# Patient Record
Sex: FEMALE | Race: BLACK OR AFRICAN AMERICAN | NOT HISPANIC OR LATINO
[De-identification: names, ages, dates, MRNs, and addresses within clinical notes are randomized per-mention and may not be internally consistent; named-entity substitution may affect disease eponyms.]

---

## 2017-01-31 ENCOUNTER — APPOINTMENT (OUTPATIENT)
Dept: CARDIOLOGY | Facility: CLINIC | Age: 77
End: 2017-01-31

## 2017-01-31 ENCOUNTER — NON-APPOINTMENT (OUTPATIENT)
Age: 77
End: 2017-01-31

## 2017-01-31 VITALS
DIASTOLIC BLOOD PRESSURE: 70 MMHG | SYSTOLIC BLOOD PRESSURE: 110 MMHG | HEIGHT: 66 IN | WEIGHT: 180 LBS | BODY MASS INDEX: 28.93 KG/M2

## 2017-04-30 ENCOUNTER — EMERGENCY (EMERGENCY)
Facility: HOSPITAL | Age: 77
LOS: 1 days | Discharge: HOME | End: 2017-04-30
Attending: EMERGENCY MEDICINE | Admitting: EMERGENCY MEDICINE
Payer: MEDICARE

## 2017-04-30 VITALS
TEMPERATURE: 98 F | SYSTOLIC BLOOD PRESSURE: 165 MMHG | HEART RATE: 80 BPM | OXYGEN SATURATION: 97 % | RESPIRATION RATE: 18 BRPM | DIASTOLIC BLOOD PRESSURE: 100 MMHG

## 2017-04-30 VITALS
SYSTOLIC BLOOD PRESSURE: 152 MMHG | RESPIRATION RATE: 20 BRPM | DIASTOLIC BLOOD PRESSURE: 93 MMHG | HEART RATE: 55 BPM | TEMPERATURE: 99 F | OXYGEN SATURATION: 99 %

## 2017-04-30 DIAGNOSIS — E07.9 DISORDER OF THYROID, UNSPECIFIED: Chronic | ICD-10-CM

## 2017-04-30 DIAGNOSIS — Z90.710 ACQUIRED ABSENCE OF BOTH CERVIX AND UTERUS: Chronic | ICD-10-CM

## 2017-04-30 LAB
APTT BLD: 142.6 SEC — CRITICAL HIGH (ref 27.5–37.4)
APTT BLD: 65.8 SEC — HIGH (ref 27.5–37.4)
BASOPHILS # BLD AUTO: 0.04 K/UL — SIGNIFICANT CHANGE UP (ref 0–0.2)
BASOPHILS NFR BLD AUTO: 0.5 % — SIGNIFICANT CHANGE UP (ref 0–2)
EOSINOPHIL # BLD AUTO: 0.08 K/UL — SIGNIFICANT CHANGE UP (ref 0–0.5)
EOSINOPHIL NFR BLD AUTO: 1 % — SIGNIFICANT CHANGE UP (ref 0–6)
HCT VFR BLD CALC: 46.4 % — HIGH (ref 34.5–45)
HGB BLD-MCNC: 14.8 G/DL — SIGNIFICANT CHANGE UP (ref 11.5–15.5)
IMM GRANULOCYTES NFR BLD AUTO: 0.4 % — SIGNIFICANT CHANGE UP (ref 0–1.5)
INR BLD: 4.88 — HIGH (ref 0.88–1.17)
INR BLD: 5.13 — CRITICAL HIGH (ref 0.88–1.17)
LYMPHOCYTES # BLD AUTO: 2.39 K/UL — SIGNIFICANT CHANGE UP (ref 1–3.3)
LYMPHOCYTES # BLD AUTO: 28.6 % — SIGNIFICANT CHANGE UP (ref 13–44)
MCHC RBC-ENTMCNC: 28.1 PG — SIGNIFICANT CHANGE UP (ref 27–34)
MCHC RBC-ENTMCNC: 31.9 % — LOW (ref 32–36)
MCV RBC AUTO: 88.2 FL — SIGNIFICANT CHANGE UP (ref 80–100)
MONOCYTES # BLD AUTO: 0.69 K/UL — SIGNIFICANT CHANGE UP (ref 0–0.9)
MONOCYTES NFR BLD AUTO: 8.3 % — SIGNIFICANT CHANGE UP (ref 2–14)
NEUTROPHILS # BLD AUTO: 5.12 K/UL — SIGNIFICANT CHANGE UP (ref 1.8–7.4)
NEUTROPHILS NFR BLD AUTO: 61.2 % — SIGNIFICANT CHANGE UP (ref 43–77)
PLATELET # BLD AUTO: 308 K/UL — SIGNIFICANT CHANGE UP (ref 150–400)
PMV BLD: 12 FL — SIGNIFICANT CHANGE UP (ref 7–13)
PROTHROM AB SERPL-ACNC: 56.5 SEC — HIGH (ref 9.8–13.1)
PROTHROM AB SERPL-ACNC: 59.4 SEC — HIGH (ref 9.8–13.1)
RBC # BLD: 5.26 M/UL — HIGH (ref 3.8–5.2)
RBC # FLD: 14.3 % — SIGNIFICANT CHANGE UP (ref 10.3–14.5)
WBC # BLD: 8.35 K/UL — SIGNIFICANT CHANGE UP (ref 3.8–10.5)
WBC # FLD AUTO: 8.35 K/UL — SIGNIFICANT CHANGE UP (ref 3.8–10.5)

## 2017-04-30 PROCEDURE — 30901 CONTROL OF NOSEBLEED: CPT | Mod: RT

## 2017-04-30 PROCEDURE — 99284 EMERGENCY DEPT VISIT MOD MDM: CPT | Mod: 25,GC

## 2017-04-30 RX ORDER — CEPHALEXIN 500 MG
1 CAPSULE ORAL
Qty: 30 | Refills: 0 | OUTPATIENT
Start: 2017-04-30 | End: 2017-05-10

## 2017-04-30 RX ORDER — TRANEXAMIC ACID 100 MG/ML
1000 INJECTION, SOLUTION INTRAVENOUS ONCE
Qty: 0 | Refills: 0 | Status: DISCONTINUED | OUTPATIENT
Start: 2017-04-30 | End: 2017-04-30

## 2017-04-30 RX ORDER — CEPHALEXIN 500 MG
500 CAPSULE ORAL EVERY 12 HOURS
Qty: 0 | Refills: 0 | Status: DISCONTINUED | OUTPATIENT
Start: 2017-04-30 | End: 2017-05-04

## 2017-04-30 RX ADMIN — Medication 500 MILLIGRAM(S): at 15:44

## 2017-04-30 NOTE — ED PROVIDER NOTE - PLAN OF CARE
Follow up with your PMD this week.  Do not take your coumadin for the next 2 days until you see your MD.  Repeat or worsening bleeding or any other symptoms of concern return to ED.

## 2017-04-30 NOTE — ED PROVIDER NOTE - MEDICAL DECISION MAKING DETAILS
78 y/o F w/pmh CVA, a-fib on coumadin , HTN p/w right sided nose bleeding since 7:30pm last night, right nare bleeding, cannot visualize source of active bleeding, mild b/l pale eye conjunctiva.  -CBC/ PT/PTT/INR

## 2017-04-30 NOTE — ED PROVIDER NOTE - OBJECTIVE STATEMENT
78 y/o F w/ PMH CVA in 2008 with residual right sided weakness, HTN, arrythmia, currently on coumadin p/w right sided nose bleed since 7:30pm last night. Pt states that the nose started suddenly, described as dripping. Pt denies hx of nose bleeds or bleeding tendencies. She denies recent trauma/falls, fever/chills, headache, dizziness, weakness, hemoptysis, melena, recent URI, cough, chest pain, palpitations, SOB, abdominal pain, N/V.

## 2017-04-30 NOTE — ED ADULT NURSE NOTE - OBJECTIVE STATEMENT
Patient received to room 7 intake alert, oriented x3, able to make needs known verbally. patient complaining of nose bleed oozing from left nare.  Patient reports having the nose bleed since last night around 7:30 pm. Patient currently taking angicoagulant.  Vital signs recorded, MATTIE Tellez at bedside evaluating patient.  Bloodwork obtained, no complaints of pain.  Patient ambulatory with cane and one person assist.  No sign of acute distress noted, will continue to monitor.

## 2017-04-30 NOTE — ED PROVIDER NOTE - ATTENDING CONTRIBUTION TO CARE
78 y/o F with h/o stroke, htn, af on coumadin here with epistaxis.  Pt reports bleeding from right nare since last night.  No h/o trauma.  Last dose of coumadin last night.  Had INR check on Wed, but does not know results.  Not on any other antiplatelets.  No other complaints.  Well appearing, lying comfortably in stretcher, awake and alert, nontoxic.  VSS.  NCAT, EOMI, (+)dried blood to anterior right nare, no nasal deformity, no bleeding to post oropharynx, left nare is patent without bleeding.  INR elevated here, will have hold next dose, PTT also elevated, will send for heme follow-up for further work-up.  Direct pressure, topical txa, reassess.

## 2017-04-30 NOTE — ED PROVIDER NOTE - PHYSICAL EXAMINATION
Skin: Skin turgor, normal cap refill  Nose: Right nostril bleeding, cannot visualize source of active bleeding  Eyes: Mild b/l pale conjunctiva

## 2017-04-30 NOTE — ED PROVIDER NOTE - CARE PLAN
Principal Discharge DX:	Elevated INR  Instructions for follow-up, activity and diet:	Follow up with your PMD this week.  Do not take your coumadin for the next 2 days until you see your MD.  Repeat or worsening bleeding or any other symptoms of concern return to ED.

## 2017-05-01 ENCOUNTER — EMERGENCY (EMERGENCY)
Facility: HOSPITAL | Age: 77
LOS: 1 days | Discharge: HOME | End: 2017-05-01
Attending: EMERGENCY MEDICINE | Admitting: EMERGENCY MEDICINE
Payer: MEDICARE

## 2017-05-01 VITALS
DIASTOLIC BLOOD PRESSURE: 88 MMHG | RESPIRATION RATE: 16 BRPM | TEMPERATURE: 99 F | SYSTOLIC BLOOD PRESSURE: 148 MMHG | HEART RATE: 86 BPM | OXYGEN SATURATION: 97 %

## 2017-05-01 DIAGNOSIS — Z90.710 ACQUIRED ABSENCE OF BOTH CERVIX AND UTERUS: Chronic | ICD-10-CM

## 2017-05-01 DIAGNOSIS — E07.9 DISORDER OF THYROID, UNSPECIFIED: Chronic | ICD-10-CM

## 2017-05-01 PROCEDURE — 99285 EMERGENCY DEPT VISIT HI MDM: CPT

## 2017-05-01 NOTE — ED PROVIDER NOTE - MEDICAL DECISION MAKING DETAILS
77y F presents for epistaxis. Rhino rocket removed. No active bleeding. Will recheck labs as pt has been on antibiotics and observe for bleeding.

## 2017-05-01 NOTE — ED PROVIDER NOTE - NOSE FINDINGS
right nasal tampon in place. no active bleeding. right nasal tampon in place. no active bleeding. ome irritation to septum, no septal hematoma or necrosis

## 2017-05-01 NOTE — ED ADULT TRIAGE NOTE - CHIEF COMPLAINT QUOTE
Here to remove nasal rocket. Unable to see MD outpatient. Pt had in placed for 1 day. Pt is on coumadin. Here to remove nasal rocket. Unable to see MD outpatient. Pt had it in placed for 1 day. Pt is on coumadin.

## 2017-05-01 NOTE — ED PROVIDER NOTE - PROGRESS NOTE DETAILS
MATTIE Vera RW - received pt s/p Quids eval by Dr loredo, pt s/p epistaxis, had packing inserted 2 days ago, couldn't get appoitment with ent doc, here for packing removal (was removed by Dr Loredo); pt offers no compliants but upon last visit had elevated INR - recheck pending - if going down will dc with cardio/ent f/u,. MATTIE Vera - spoke with Dr Dent cardio - pt f/u with pcp for inr check and she advsies calling pcp; MATTIE Vera - spoke with Dr Dent cardio - pt f/u with pcp for inr check and she advises calling pcp; message left for call back with Pmd's answering service' MATTIE Vera - no call back from pmd, discussed with dr malave - will advise to restart Coumadin tomorrow, f/u with pm on 5/3, bacitracin ointment to the are twice daily.

## 2017-05-01 NOTE — ED PROVIDER NOTE - OBJECTIVE STATEMENT
77y F with PMHx of AFib on Coumadin presents to the ED for nasal rocket removal today. Pt had nasal rocket placed in right nostril yesterday. Pt taking antibiotics. Denies any bleeding, fever, chills, or any other complaints. 77y F with PMHx of AFib on Coumadin presents to the ED for nasal rocket removal today. Pt had epistaxis yesterday and had nasal rocket placed in right nostril. Pt taking antibiotics. Denies any bleeding, fever, chills, or any other complaints.

## 2017-05-01 NOTE — ED PROVIDER NOTE - NS ED MD SCRIBE ATTENDING SCRIBE SECTIONS
REVIEW OF SYSTEMS/DISPOSITION/HISTORY OF PRESENT ILLNESS/PAST MEDICAL/SURGICAL/SOCIAL HISTORY/PHYSICAL EXAM/VITAL SIGNS( Pullset)

## 2017-05-01 NOTE — ED PROVIDER NOTE - PLAN OF CARE
PLEASE START TAKING COUMADIN TOMORROW, FOLLOW UP WITH YOUR DOCTOR WITHIN NEXT 48HRS FOR REPEAT BLOOD WORK. APPLY BACITRACIN ANTIBIOTIC TO THE AREA THREE TIMES DAILY, CONTINUE TAKING ANTIBIOTICS AS PRESCRIBED. RETURN TO ER FOR PROLONGED NOSE BLEED, CHEST PAIN, DIZZINESS, SHORTNESS OF BREATH.

## 2017-05-01 NOTE — ED PROVIDER NOTE - CARE PLAN
Principal Discharge DX:	Epistaxis  Secondary Diagnosis:	INR (international normal ratio) abnormal Principal Discharge DX:	Epistaxis  Instructions for follow-up, activity and diet:	PLEASE START TAKING COUMADIN TOMORROW, FOLLOW UP WITH YOUR DOCTOR WITHIN NEXT 48HRS FOR REPEAT BLOOD WORK. APPLY BACITRACIN ANTIBIOTIC TO THE AREA THREE TIMES DAILY, CONTINUE TAKING ANTIBIOTICS AS PRESCRIBED. RETURN TO ER FOR PROLONGED NOSE BLEED, CHEST PAIN, DIZZINESS, SHORTNESS OF BREATH.  Secondary Diagnosis:	INR (international normal ratio) abnormal

## 2017-05-02 ENCOUNTER — NON-APPOINTMENT (OUTPATIENT)
Age: 77
End: 2017-05-02

## 2017-05-02 ENCOUNTER — APPOINTMENT (OUTPATIENT)
Dept: CARDIOLOGY | Facility: CLINIC | Age: 77
End: 2017-05-02

## 2017-05-02 VITALS
HEART RATE: 81 BPM | WEIGHT: 185 LBS | BODY MASS INDEX: 29.73 KG/M2 | HEIGHT: 66 IN | SYSTOLIC BLOOD PRESSURE: 92 MMHG | DIASTOLIC BLOOD PRESSURE: 60 MMHG

## 2017-06-13 ENCOUNTER — APPOINTMENT (OUTPATIENT)
Dept: CARDIOLOGY | Facility: CLINIC | Age: 77
End: 2017-06-13

## 2017-06-13 VITALS
BODY MASS INDEX: 28.93 KG/M2 | SYSTOLIC BLOOD PRESSURE: 132 MMHG | DIASTOLIC BLOOD PRESSURE: 74 MMHG | HEART RATE: 72 BPM | HEIGHT: 66 IN | WEIGHT: 180 LBS | OXYGEN SATURATION: 96 %

## 2017-08-22 ENCOUNTER — APPOINTMENT (OUTPATIENT)
Dept: OTOLARYNGOLOGY | Facility: CLINIC | Age: 77
End: 2017-08-22
Payer: MEDICARE

## 2017-08-22 VITALS
WEIGHT: 180 LBS | HEIGHT: 66 IN | BODY MASS INDEX: 28.93 KG/M2 | HEART RATE: 54 BPM | DIASTOLIC BLOOD PRESSURE: 85 MMHG | SYSTOLIC BLOOD PRESSURE: 138 MMHG

## 2017-08-22 PROCEDURE — 99203 OFFICE O/P NEW LOW 30 MIN: CPT

## 2017-10-12 ENCOUNTER — APPOINTMENT (OUTPATIENT)
Dept: CARDIOLOGY | Facility: CLINIC | Age: 77
End: 2017-10-12
Payer: MEDICARE

## 2017-10-12 VITALS
SYSTOLIC BLOOD PRESSURE: 122 MMHG | HEART RATE: 78 BPM | HEIGHT: 66 IN | RESPIRATION RATE: 14 BRPM | DIASTOLIC BLOOD PRESSURE: 70 MMHG | WEIGHT: 182 LBS | BODY MASS INDEX: 29.25 KG/M2

## 2017-10-12 PROCEDURE — 99214 OFFICE O/P EST MOD 30 MIN: CPT

## 2017-10-13 ENCOUNTER — APPOINTMENT (OUTPATIENT)
Dept: OTOLARYNGOLOGY | Facility: CLINIC | Age: 77
End: 2017-10-13
Payer: MEDICARE

## 2017-10-13 ENCOUNTER — APPOINTMENT (OUTPATIENT)
Dept: CARDIOLOGY | Facility: CLINIC | Age: 77
End: 2017-10-13

## 2017-10-13 VITALS
HEIGHT: 66 IN | DIASTOLIC BLOOD PRESSURE: 94 MMHG | SYSTOLIC BLOOD PRESSURE: 186 MMHG | BODY MASS INDEX: 29.25 KG/M2 | WEIGHT: 182 LBS | HEART RATE: 80 BPM

## 2017-10-13 PROCEDURE — 31231 NASAL ENDOSCOPY DX: CPT

## 2017-10-13 PROCEDURE — 99214 OFFICE O/P EST MOD 30 MIN: CPT | Mod: 25

## 2017-10-13 RX ORDER — DICLOFENAC SODIUM 10 MG/G
1 GEL TOPICAL
Qty: 100 | Refills: 0 | Status: DISCONTINUED | COMMUNITY
Start: 2016-10-26

## 2017-10-13 RX ORDER — APIXABAN 5 MG/1
5 TABLET, FILM COATED ORAL
Refills: 0 | Status: DISCONTINUED | COMMUNITY
End: 2017-10-13

## 2017-10-19 ENCOUNTER — APPOINTMENT (OUTPATIENT)
Dept: CARDIOLOGY | Facility: CLINIC | Age: 77
End: 2017-10-19
Payer: MEDICARE

## 2017-10-19 PROCEDURE — 93306 TTE W/DOPPLER COMPLETE: CPT

## 2018-01-15 ENCOUNTER — APPOINTMENT (OUTPATIENT)
Dept: CARDIOLOGY | Facility: CLINIC | Age: 78
End: 2018-01-15
Payer: MEDICARE

## 2018-01-15 ENCOUNTER — NON-APPOINTMENT (OUTPATIENT)
Age: 78
End: 2018-01-15

## 2018-01-15 VITALS
WEIGHT: 179 LBS | SYSTOLIC BLOOD PRESSURE: 148 MMHG | DIASTOLIC BLOOD PRESSURE: 82 MMHG | BODY MASS INDEX: 28.77 KG/M2 | HEART RATE: 90 BPM | HEIGHT: 66 IN

## 2018-01-15 PROCEDURE — 93000 ELECTROCARDIOGRAM COMPLETE: CPT

## 2018-01-15 PROCEDURE — 99214 OFFICE O/P EST MOD 30 MIN: CPT

## 2018-01-18 ENCOUNTER — OUTPATIENT (OUTPATIENT)
Dept: OUTPATIENT SERVICES | Facility: HOSPITAL | Age: 78
LOS: 1 days | End: 2018-01-18
Payer: MEDICARE

## 2018-01-18 VITALS
WEIGHT: 181 LBS | TEMPERATURE: 98 F | HEART RATE: 92 BPM | HEIGHT: 65.5 IN | RESPIRATION RATE: 16 BRPM | DIASTOLIC BLOOD PRESSURE: 78 MMHG | SYSTOLIC BLOOD PRESSURE: 170 MMHG

## 2018-01-18 DIAGNOSIS — Z90.710 ACQUIRED ABSENCE OF BOTH CERVIX AND UTERUS: Chronic | ICD-10-CM

## 2018-01-18 DIAGNOSIS — J34.89 OTHER SPECIFIED DISORDERS OF NOSE AND NASAL SINUSES: ICD-10-CM

## 2018-01-18 DIAGNOSIS — E07.9 DISORDER OF THYROID, UNSPECIFIED: Chronic | ICD-10-CM

## 2018-01-18 DIAGNOSIS — I48.91 UNSPECIFIED ATRIAL FIBRILLATION: ICD-10-CM

## 2018-01-18 DIAGNOSIS — I10 ESSENTIAL (PRIMARY) HYPERTENSION: ICD-10-CM

## 2018-01-18 DIAGNOSIS — I63.9 CEREBRAL INFARCTION, UNSPECIFIED: ICD-10-CM

## 2018-01-18 LAB
BUN SERPL-MCNC: 15 MG/DL — SIGNIFICANT CHANGE UP (ref 7–23)
CALCIUM SERPL-MCNC: 9.4 MG/DL — SIGNIFICANT CHANGE UP (ref 8.4–10.5)
CHLORIDE SERPL-SCNC: 100 MMOL/L — SIGNIFICANT CHANGE UP (ref 98–107)
CO2 SERPL-SCNC: 26 MMOL/L — SIGNIFICANT CHANGE UP (ref 22–31)
CREAT SERPL-MCNC: 0.63 MG/DL — SIGNIFICANT CHANGE UP (ref 0.5–1.3)
GLUCOSE SERPL-MCNC: 95 MG/DL — SIGNIFICANT CHANGE UP (ref 70–99)
HCT VFR BLD CALC: 44.4 % — SIGNIFICANT CHANGE UP (ref 34.5–45)
HGB BLD-MCNC: 14.1 G/DL — SIGNIFICANT CHANGE UP (ref 11.5–15.5)
MCHC RBC-ENTMCNC: 28.1 PG — SIGNIFICANT CHANGE UP (ref 27–34)
MCHC RBC-ENTMCNC: 31.8 % — LOW (ref 32–36)
MCV RBC AUTO: 88.4 FL — SIGNIFICANT CHANGE UP (ref 80–100)
NRBC # FLD: 0 — SIGNIFICANT CHANGE UP
PLATELET # BLD AUTO: 294 K/UL — SIGNIFICANT CHANGE UP (ref 150–400)
PMV BLD: 11.2 FL — SIGNIFICANT CHANGE UP (ref 7–13)
POTASSIUM SERPL-MCNC: 4 MMOL/L — SIGNIFICANT CHANGE UP (ref 3.5–5.3)
POTASSIUM SERPL-SCNC: 4 MMOL/L — SIGNIFICANT CHANGE UP (ref 3.5–5.3)
RBC # BLD: 5.02 M/UL — SIGNIFICANT CHANGE UP (ref 3.8–5.2)
RBC # FLD: 13.9 % — SIGNIFICANT CHANGE UP (ref 10.3–14.5)
SODIUM SERPL-SCNC: 141 MMOL/L — SIGNIFICANT CHANGE UP (ref 135–145)
WBC # BLD: 7.72 K/UL — SIGNIFICANT CHANGE UP (ref 3.8–10.5)
WBC # FLD AUTO: 7.72 K/UL — SIGNIFICANT CHANGE UP (ref 3.8–10.5)

## 2018-01-18 PROCEDURE — 93010 ELECTROCARDIOGRAM REPORT: CPT

## 2018-01-18 RX ORDER — LISINOPRIL 2.5 MG/1
0 TABLET ORAL
Qty: 0 | Refills: 0 | COMMUNITY

## 2018-01-18 RX ORDER — AMLODIPINE BESYLATE 2.5 MG/1
0 TABLET ORAL
Qty: 0 | Refills: 0 | COMMUNITY

## 2018-01-18 RX ORDER — WARFARIN SODIUM 2.5 MG/1
0 TABLET ORAL
Qty: 0 | Refills: 0 | COMMUNITY

## 2018-01-18 NOTE — H&P PST ADULT - PROBLEM SELECTOR PLAN 2
Pt is currently on Eliquis. Spoke to Cardiologist Dr. Dent and she said the minimum time the pt should hold the Eliquis is 48 hours and if there is a possibility for significant blood loss, Eliquis should be held for 48-72 hours. Will Notify Dr. Fuentes  Cardiac clearance note requested Pt is currently on Eliquis. Spoke to Cardiologist Dr. Dent and she said the minimum time the pt should hold the Eliquis is 48 hours and if there is a possibility for significant blood loss, Eliquis should be held for 48-72 hours. Emailed Dr. Fuentes with the recommendation provided by the Cardiologist.  Cardiac clearance note requested

## 2018-01-18 NOTE — H&P PST ADULT - ACTIVITY
walks outside and in lobby of building, moderate assisted with ADLs walks outside and in lobby of building daily, moderate assisted with ADLs

## 2018-01-18 NOTE — H&P PST ADULT - RS GEN PE MLT RESP DETAILS PC
respirations non-labored/airway patent/breath sounds equal/good air movement/no chest wall tenderness/clear to auscultation bilaterally

## 2018-01-18 NOTE — H&P PST ADULT - PMH
Atrial fibrillation  currently on Eliquis since summer 2017  Bradycardia    CVA (cerebral vascular accident)  2008, right side weakness and impaired speech since CVA  Hypertension    Other specified disorders of nose and nasal sinuses    Tricuspid regurgitation

## 2018-01-18 NOTE — H&P PST ADULT - PROBLEM SELECTOR PLAN 1
Scheduled for Resection of Right Nasal Vestibular Stenosis, Nasal Alar Repositioning, Full Thickness Skin Graft on 1/23/2018.  Pre op instructions given, pt verbalized understanding   GI prophylaxis provided

## 2018-01-18 NOTE — H&P PST ADULT - ENMT COMMENTS
dx of other specified disorders of nose and nasal sinuses. See HPI right nasal turbinate almost completely closed off. Nose: right nare stenosis

## 2018-01-18 NOTE — H&P PST ADULT - NEGATIVE BREAST SYMPTOMS
no nipple discharge R/no breast lump L/no breast tenderness L/no breast tenderness R/no breast lump R/no nipple discharge L

## 2018-01-18 NOTE — H&P PST ADULT - NEUROLOGICAL COMMENTS
h/o CVA in 2008. Pt with residual of right side weakness. Speech is still affected but much improved over the years. h/o CVA in 2008. Residual right side weakness. Speech is still affected but much improved over the years. h/o CVA in 2008. Residual right side weakness. Speech is still affected but much improved over the years. Pt has never seen Neuro since CVA. follows up with PCP

## 2018-01-18 NOTE — H&P PST ADULT - NSANTHOSAYNRD_GEN_A_CORE
No. MANDO screening performed.  STOP BANG Legend: 0-2 = LOW Risk; 3-4 = INTERMEDIATE Risk; 5-8 = HIGH Risk

## 2018-01-18 NOTE — H&P PST ADULT - PROBLEM SELECTOR PLAN 4
Pt instructed to take Enalapril, Carvedilol, Clonidine and Amlodipine AM of surgery with a sip of water  ECHO, Stress Test reports requested. BP reassessed and 160/90   Pt instructed to take Enalapril, Carvedilol, Clonidine and Amlodipine AM of surgery with a sip of water  ECHO, Stress Test reports requested.

## 2018-01-18 NOTE — H&P PST ADULT - LYMPHATIC
posterior cervical L/anterior cervical L/supraclavicular L/posterior cervical R/anterior cervical R/supraclavicular R

## 2018-01-18 NOTE — H&P PST ADULT - REASON FOR ADMISSION
As per daughter, "My mother is having surgery to open up her nasal passage". As per sister: "She is having surgery to open up her nasal passage".

## 2018-01-18 NOTE — H&P PST ADULT - NEGATIVE NEUROLOGICAL SYMPTOMS
no confusion/no loss of consciousness/no transient paralysis/no tremors/no hemiparesis/no facial palsy/no headache/no weakness

## 2018-01-18 NOTE — H&P PST ADULT - HISTORY OF PRESENT ILLNESS
76 y/o female with PMH of Afib on Eliquis, HTN, Tricuspid Regurgitation presents to PST for preoperative evaluation with dx of other specified disorders of nose and nasal sinuses.  H/o nose bleeds while on Coumadin. s/p insertion of a  rhino picket in May 2017 which stopped the bleeding. A few months later, pt noticed her right nostril became stenosed and she had trouble breathing through that nostril.  Seen by ENT and Scheduled for Resection of Right Nasal Vestibular Stenosis, Nasal Alar Repositioning, Full Thickness Skin Graft on 1/23/2018. She is currently on Eliquis for the management of her Afib. 76 y/o female with PMH of Afib on Eliquis, HTN, Tricuspid Regurgitation and CVA in 2008 presents to PST for preoperative evaluation with dx of other specified disorders of nose and nasal sinuses.  H/o nose bleeds while on Coumadin. s/p insertion of a rhino rocket in May 2017 which stopped the bleeding. A few months later, pt noticed her right nostril became stenosed and she had trouble breathing through that nostril.  Seen by ENT and Scheduled for Resection of Right Nasal Vestibular Stenosis, Nasal Alar Repositioning, Full Thickness Skin Graft on 1/23/2018. She is currently on Eliquis for the management of Afib.

## 2018-01-18 NOTE — H&P PST ADULT - ENT GEN HX ROS MEA POS PC
last nose bleed since may 2017 while on coumadin/epistaxis epistaxis/last nose bleed May 2017 while on coumadin

## 2018-01-18 NOTE — H&P PST ADULT - NEGATIVE CARDIOVASCULAR SYMPTOMS
no peripheral edema/no dyspnea on exertion/no chest pain/no paroxysmal nocturnal dyspnea/no palpitations

## 2018-01-18 NOTE — H&P PST ADULT - PSH
Disorder of thyroid  s/p partial thryroidectomy 35 years ago  H/O: hysterectomy  30 years ago Disorder of thyroid  s/p partial thyroidectomy 35 years ago  H/O: hysterectomy  30 years ago

## 2018-01-18 NOTE — H&P PST ADULT - FAMILY HISTORY
Mother  Still living? No  Family history of heart attack, Age at diagnosis: Age Unknown     Father  Still living? Unknown  Family history of atrial fibrillation, Age at diagnosis: Age Unknown  Family history of cerebrovascular accident (CVA), Age at diagnosis: Age Unknown     Sibling  Still living? Unknown  Family history of atrial fibrillation, Age at diagnosis: Age Unknown

## 2018-01-23 ENCOUNTER — TRANSCRIPTION ENCOUNTER (OUTPATIENT)
Age: 78
End: 2018-01-23

## 2018-01-23 ENCOUNTER — OUTPATIENT (OUTPATIENT)
Dept: OUTPATIENT SERVICES | Facility: HOSPITAL | Age: 78
LOS: 1 days | Discharge: HOME | End: 2018-01-23
Payer: MEDICARE

## 2018-01-23 ENCOUNTER — APPOINTMENT (OUTPATIENT)
Dept: OTOLARYNGOLOGY | Facility: HOSPITAL | Age: 78
End: 2018-01-23

## 2018-01-23 ENCOUNTER — RESULT REVIEW (OUTPATIENT)
Age: 78
End: 2018-01-23

## 2018-01-23 VITALS
TEMPERATURE: 98 F | DIASTOLIC BLOOD PRESSURE: 89 MMHG | SYSTOLIC BLOOD PRESSURE: 155 MMHG | OXYGEN SATURATION: 97 % | RESPIRATION RATE: 15 BRPM | HEART RATE: 76 BPM

## 2018-01-23 VITALS
SYSTOLIC BLOOD PRESSURE: 155 MMHG | RESPIRATION RATE: 20 BRPM | HEIGHT: 65 IN | TEMPERATURE: 97 F | WEIGHT: 179.9 LBS | OXYGEN SATURATION: 97 % | HEART RATE: 74 BPM | DIASTOLIC BLOOD PRESSURE: 86 MMHG

## 2018-01-23 DIAGNOSIS — E07.9 DISORDER OF THYROID, UNSPECIFIED: Chronic | ICD-10-CM

## 2018-01-23 DIAGNOSIS — J34.89 OTHER SPECIFIED DISORDERS OF NOSE AND NASAL SINUSES: ICD-10-CM

## 2018-01-23 DIAGNOSIS — Z90.710 ACQUIRED ABSENCE OF BOTH CERVIX AND UTERUS: Chronic | ICD-10-CM

## 2018-01-23 PROCEDURE — 30465 REPAIR NASAL STENOSIS: CPT | Mod: RT,GC

## 2018-01-23 PROCEDURE — 15760 COMPOSITE SKIN GRAFT: CPT

## 2018-01-23 PROCEDURE — 88302 TISSUE EXAM BY PATHOLOGIST: CPT | Mod: 26

## 2018-01-23 RX ORDER — FENTANYL CITRATE 50 UG/ML
50 INJECTION INTRAVENOUS
Qty: 0 | Refills: 0 | Status: DISCONTINUED | OUTPATIENT
Start: 2018-01-23 | End: 2018-01-24

## 2018-01-23 RX ORDER — ONDANSETRON 8 MG/1
4 TABLET, FILM COATED ORAL ONCE
Qty: 0 | Refills: 0 | Status: DISCONTINUED | OUTPATIENT
Start: 2018-01-23 | End: 2018-01-24

## 2018-01-23 RX ORDER — SODIUM CHLORIDE 9 MG/ML
1000 INJECTION, SOLUTION INTRAVENOUS
Qty: 0 | Refills: 0 | Status: DISCONTINUED | OUTPATIENT
Start: 2018-01-23 | End: 2018-01-24

## 2018-01-23 RX ORDER — OXYCODONE AND ACETAMINOPHEN 5; 325 MG/1; MG/1
1 TABLET ORAL EVERY 6 HOURS
Qty: 0 | Refills: 0 | Status: DISCONTINUED | OUTPATIENT
Start: 2018-01-23 | End: 2018-01-24

## 2018-01-23 RX ORDER — FENTANYL CITRATE 50 UG/ML
25 INJECTION INTRAVENOUS
Qty: 0 | Refills: 0 | Status: DISCONTINUED | OUTPATIENT
Start: 2018-01-23 | End: 2018-01-24

## 2018-01-23 RX ORDER — OXYCODONE AND ACETAMINOPHEN 5; 325 MG/1; MG/1
1 TABLET ORAL
Qty: 12 | Refills: 0
Start: 2018-01-23 | End: 2018-01-25

## 2018-01-23 RX ORDER — SODIUM CHLORIDE 9 MG/ML
1000 INJECTION, SOLUTION INTRAVENOUS
Qty: 0 | Refills: 0 | Status: DISCONTINUED | OUTPATIENT
Start: 2018-01-23 | End: 2018-01-23

## 2018-01-23 RX ORDER — FUROSEMIDE 40 MG
1 TABLET ORAL
Qty: 0 | Refills: 0 | COMMUNITY

## 2018-01-23 RX ORDER — CEPHALEXIN 500 MG
1 CAPSULE ORAL
Qty: 14 | Refills: 0
Start: 2018-01-23 | End: 2018-01-29

## 2018-01-23 RX ORDER — SODIUM CHLORIDE 9 MG/ML
3 INJECTION INTRAMUSCULAR; INTRAVENOUS; SUBCUTANEOUS ONCE
Qty: 0 | Refills: 0 | Status: DISCONTINUED | OUTPATIENT
Start: 2018-01-23 | End: 2018-01-24

## 2018-01-23 RX ORDER — APIXABAN 2.5 MG/1
1 TABLET, FILM COATED ORAL
Qty: 0 | Refills: 0 | COMMUNITY

## 2018-01-23 RX ORDER — CARVEDILOL PHOSPHATE 80 MG/1
1 CAPSULE, EXTENDED RELEASE ORAL
Qty: 0 | Refills: 0 | COMMUNITY

## 2018-01-23 RX ADMIN — SODIUM CHLORIDE 75 MILLILITER(S): 9 INJECTION, SOLUTION INTRAVENOUS at 17:28

## 2018-01-23 NOTE — ASU DISCHARGE PLAN (ADULT/PEDIATRIC). - ITEMS TO FOLLOWUP WITH YOUR PHYSICIAN'S
After showering pat dry steri strips.  Do Not rub them.  They will curl up and fall off by themselves within 7 days.   No strenuous exercising, aerobics or lifting for two weeks. Liquid and soft diet for the first 24 hours. Resume regular diet as tolerated. Nothing hot in temperature to eat or drink, avoid hot baths.  No nose blowing - sneeze with mouth open.

## 2018-01-23 NOTE — ASU DISCHARGE PLAN (ADULT/PEDIATRIC). - COMMENTS
Surgical Unit will call you on the next business day to follow up. [facility] is open Monday - Friday.

## 2018-01-23 NOTE — ASU DISCHARGE PLAN (ADULT/PEDIATRIC). - NURSING INSTRUCTIONS
See medication reconcilliation record  You were given an antibiotic ( Cefazolin 2000mg ) in OR today about 3:30pm  You were given IV Tylenol for pain management.  Please DO NOT take tylenol for the next 6-8 hours (after 11pm ). Please do not exceed 3000mg in 24hours.   When taking pain meds - take with food and know it may cause constipation. To prevent constipation increase fluids and fiber in diet. - Do NOT drive while on narcotics.

## 2018-01-23 NOTE — ASU DISCHARGE PLAN (ADULT/PEDIATRIC). - MEDICATION SUMMARY - MEDICATIONS TO TAKE
I will START or STAY ON the medications listed below when I get home from the hospital:    MVI 1 tab orally daily   last dose 1/18  -- Indication: For home med    oxyCODONE-acetaminophen 5 mg-325 mg oral tablet  -- 1 tab(s) by mouth every 6 hours, As needed, Moderate Pain (4 - 6) MDD:4  -- Indication: For pain    enalapril 20 mg oral tablet  -- 1 tab(s) by mouth once a day in am  -- Indication: For home med    cloNIDine 0.2 mg oral tablet  -- 1 tab(s) by mouth 2 times a day  -- Indication: For home med    Eliquis 5 mg oral tablet  -- 1 tab(s) by mouth 2 times a day  -- Indication: For home med    carvedilol 12.5 mg oral tablet  -- 1 tab(s) by mouth 2 times a day  -- Indication: For home med    amLODIPine 5 mg oral tablet  -- 1 tab(s) by mouth once a day in am  -- Indication: For home med    Keflex 500 mg oral capsule  -- 1 cap(s) by mouth 2 times a day   -- Finish all this medication unless otherwise directed by prescriber.    -- Indication: For antibiotic    furosemide 20 mg oral tablet  -- 1 tab(s) by mouth once a day in am  -- Indication: For home med

## 2018-01-23 NOTE — ASU DISCHARGE PLAN (ADULT/PEDIATRIC). - SPECIAL INSTRUCTIONS
There is packing in the Right nostril.  Please keep this in place.  If you must sneeze, please gently plug nose so the packing does not come out.  Follow up in Dr. Fuentes clinic Friday morning to have the packing removed.  Keep tape strips on incision behind the right ear.

## 2018-01-26 ENCOUNTER — APPOINTMENT (OUTPATIENT)
Dept: OTOLARYNGOLOGY | Facility: CLINIC | Age: 78
End: 2018-01-26
Payer: MEDICARE

## 2018-01-26 LAB — SURGICAL PATHOLOGY STUDY: SIGNIFICANT CHANGE UP

## 2018-01-26 PROCEDURE — 99024 POSTOP FOLLOW-UP VISIT: CPT

## 2018-01-31 ENCOUNTER — APPOINTMENT (OUTPATIENT)
Dept: OTOLARYNGOLOGY | Facility: CLINIC | Age: 78
End: 2018-01-31
Payer: MEDICARE

## 2018-01-31 PROCEDURE — 99024 POSTOP FOLLOW-UP VISIT: CPT

## 2018-05-15 ENCOUNTER — APPOINTMENT (OUTPATIENT)
Dept: CARDIOLOGY | Facility: CLINIC | Age: 78
End: 2018-05-15
Payer: MEDICARE

## 2018-05-15 VITALS
SYSTOLIC BLOOD PRESSURE: 144 MMHG | BODY MASS INDEX: 29.25 KG/M2 | OXYGEN SATURATION: 99 % | HEIGHT: 66 IN | WEIGHT: 182 LBS | DIASTOLIC BLOOD PRESSURE: 86 MMHG | HEART RATE: 70 BPM

## 2018-05-15 PROCEDURE — 93000 ELECTROCARDIOGRAM COMPLETE: CPT

## 2018-05-15 PROCEDURE — 99214 OFFICE O/P EST MOD 30 MIN: CPT

## 2018-07-16 PROBLEM — I48.91 UNSPECIFIED ATRIAL FIBRILLATION: Chronic | Status: ACTIVE | Noted: 2017-04-30

## 2018-07-16 PROBLEM — I63.9 CEREBRAL INFARCTION, UNSPECIFIED: Chronic | Status: ACTIVE | Noted: 2017-04-30

## 2018-08-14 ENCOUNTER — APPOINTMENT (OUTPATIENT)
Dept: CARDIOLOGY | Facility: CLINIC | Age: 78
End: 2018-08-14
Payer: MEDICARE

## 2018-08-14 ENCOUNTER — NON-APPOINTMENT (OUTPATIENT)
Age: 78
End: 2018-08-14

## 2018-08-14 VITALS
BODY MASS INDEX: 29.75 KG/M2 | OXYGEN SATURATION: 96 % | HEIGHT: 66 IN | SYSTOLIC BLOOD PRESSURE: 114 MMHG | WEIGHT: 185.13 LBS | HEART RATE: 66 BPM | DIASTOLIC BLOOD PRESSURE: 69 MMHG

## 2018-08-14 DIAGNOSIS — J34.89 OTHER SPECIFIED DISORDERS OF NOSE AND NASAL SINUSES: ICD-10-CM

## 2018-08-14 PROBLEM — I10 ESSENTIAL (PRIMARY) HYPERTENSION: Chronic | Status: ACTIVE | Noted: 2017-04-30

## 2018-08-14 PROBLEM — I07.1 RHEUMATIC TRICUSPID INSUFFICIENCY: Chronic | Status: ACTIVE | Noted: 2018-01-18

## 2018-08-14 PROBLEM — R00.1 BRADYCARDIA, UNSPECIFIED: Chronic | Status: ACTIVE | Noted: 2018-01-18

## 2018-08-14 PROCEDURE — 93000 ELECTROCARDIOGRAM COMPLETE: CPT

## 2018-08-14 PROCEDURE — 99214 OFFICE O/P EST MOD 30 MIN: CPT

## 2018-11-15 ENCOUNTER — APPOINTMENT (OUTPATIENT)
Dept: CARDIOLOGY | Facility: CLINIC | Age: 78
End: 2018-11-15
Payer: MEDICARE

## 2018-11-15 ENCOUNTER — NON-APPOINTMENT (OUTPATIENT)
Age: 78
End: 2018-11-15

## 2018-11-15 VITALS
WEIGHT: 185 LBS | SYSTOLIC BLOOD PRESSURE: 140 MMHG | BODY MASS INDEX: 29.73 KG/M2 | HEART RATE: 60 BPM | HEIGHT: 66 IN | OXYGEN SATURATION: 95 % | DIASTOLIC BLOOD PRESSURE: 78 MMHG

## 2018-11-15 PROCEDURE — 99214 OFFICE O/P EST MOD 30 MIN: CPT

## 2018-11-15 PROCEDURE — 93000 ELECTROCARDIOGRAM COMPLETE: CPT

## 2018-11-15 PROCEDURE — 93306 TTE W/DOPPLER COMPLETE: CPT

## 2018-11-15 NOTE — REVIEW OF SYSTEMS
[Limb Weakness (Paresis)] : limb weakness [Negative] : Endocrine [Joint Pain] : no joint pain [Joint Swelling] : no joint swelling [Easy Bleeding] : no tendency for easy bleeding [Easy Bruising] : no tendency for easy bruising

## 2018-11-15 NOTE — DISCUSSION/SUMMARY
[FreeTextEntry1] : In a summary Pepper Peralta is an elderly female with hypertension, controlled. Continue current medications and 2 gm sodium diet. Atrial fibrillation, rate controlled. On Eliquis. Tricuspid regurgitation, echo done showed normal LV systolic function and Moderate-severe TR which is unchanged from previous echo.. Asymptomatic. Follow up in 4 months.

## 2018-11-15 NOTE — HISTORY OF PRESENT ILLNESS
[FreeTextEntry1] : Pepper Peralta is a 78 year old female with history of hypertension, chronic atrial fibrillation and Tricuspid regurgitation comes for follow up visit. Denies any chest pain or shortness of breath. No palpitations. Compliant to medications and diet. Follows up with PCP.

## 2018-11-15 NOTE — PHYSICAL EXAM
[General Appearance - Well Developed] : well developed [Well Groomed] : well groomed [General Appearance - Well Nourished] : well nourished [General Appearance - In No Acute Distress] : no acute distress [Normal Conjunctiva] : the conjunctiva exhibited no abnormalities [Eyelids - No Xanthelasma] : the eyelids demonstrated no xanthelasmas [Normal Oral Mucosa] : normal oral mucosa [No Oral Pallor] : no oral pallor [Normal Oropharynx] : normal oropharynx [Respiration, Rhythm And Depth] : normal respiratory rhythm and effort [Auscultation Breath Sounds / Voice Sounds] : lungs were clear to auscultation bilaterally [Heart Rate And Rhythm] : heart rate and rhythm were normal [Edema] : no peripheral edema present [Bowel Sounds] : normal bowel sounds [Abdomen Soft] : soft [Abdomen Tenderness] : non-tender [Nail Clubbing] : no clubbing of the fingernails [Cyanosis, Localized] : no localized cyanosis [Skin Color & Pigmentation] : normal skin color and pigmentation [] : no rash [Oriented To Time, Place, And Person] : oriented to person, place, and time [Impaired Insight] : insight and judgment were intact [No Anxiety] : not feeling anxious [FreeTextEntry1] : walks with cane. mild right sided weakness.

## 2019-03-12 ENCOUNTER — APPOINTMENT (OUTPATIENT)
Dept: CARDIOLOGY | Facility: CLINIC | Age: 79
End: 2019-03-12
Payer: MEDICARE

## 2019-03-12 ENCOUNTER — NON-APPOINTMENT (OUTPATIENT)
Age: 79
End: 2019-03-12

## 2019-03-12 VITALS
HEIGHT: 66 IN | WEIGHT: 184 LBS | OXYGEN SATURATION: 96 % | DIASTOLIC BLOOD PRESSURE: 74 MMHG | HEART RATE: 65 BPM | BODY MASS INDEX: 29.57 KG/M2 | SYSTOLIC BLOOD PRESSURE: 106 MMHG

## 2019-03-12 PROCEDURE — 99214 OFFICE O/P EST MOD 30 MIN: CPT

## 2019-03-12 PROCEDURE — 93000 ELECTROCARDIOGRAM COMPLETE: CPT

## 2019-03-12 NOTE — DISCUSSION/SUMMARY
[FreeTextEntry1] : In a summary Pepper Peralta is an elderly female with hypertension, controlled. Continue current medications and 2 gm sodium diet. Atrial fibrillation, rate controlled. On Eliquis. Continue healthy lifestyle. Follow up in 4 months.

## 2019-03-12 NOTE — HISTORY OF PRESENT ILLNESS
[FreeTextEntry1] : Pepper Peralta is a 78 year old female with history of hypertension, atrial fibrillation and Tricuspid regurgitation comes for follow up visit. Denies any chest pain or palpitations. No shortness of breath. Walks with cane. Compliant to medications and diet. Follows up with PCP.

## 2019-07-09 ENCOUNTER — APPOINTMENT (OUTPATIENT)
Dept: CARDIOLOGY | Facility: CLINIC | Age: 79
End: 2019-07-09
Payer: MEDICARE

## 2019-07-09 ENCOUNTER — NON-APPOINTMENT (OUTPATIENT)
Age: 79
End: 2019-07-09

## 2019-07-09 VITALS
WEIGHT: 180 LBS | BODY MASS INDEX: 28.93 KG/M2 | HEIGHT: 66 IN | HEART RATE: 62 BPM | OXYGEN SATURATION: 94 % | SYSTOLIC BLOOD PRESSURE: 134 MMHG | DIASTOLIC BLOOD PRESSURE: 82 MMHG

## 2019-07-09 PROCEDURE — 93000 ELECTROCARDIOGRAM COMPLETE: CPT

## 2019-07-09 PROCEDURE — 99214 OFFICE O/P EST MOD 30 MIN: CPT

## 2019-07-09 NOTE — PHYSICAL EXAM
[General Appearance - Well Developed] : well developed [Well Groomed] : well groomed [General Appearance - Well Nourished] : well nourished [General Appearance - In No Acute Distress] : no acute distress [Normal Conjunctiva] : the conjunctiva exhibited no abnormalities [Eyelids - No Xanthelasma] : the eyelids demonstrated no xanthelasmas [Normal Oral Mucosa] : normal oral mucosa [No Oral Pallor] : no oral pallor [Normal Oropharynx] : normal oropharynx [Respiration, Rhythm And Depth] : normal respiratory rhythm and effort [Heart Rate And Rhythm] : heart rate and rhythm were normal [Auscultation Breath Sounds / Voice Sounds] : lungs were clear to auscultation bilaterally [Edema] : no peripheral edema present [Bowel Sounds] : normal bowel sounds [Abdomen Soft] : soft [Abdomen Tenderness] : non-tender [Nail Clubbing] : no clubbing of the fingernails [Cyanosis, Localized] : no localized cyanosis [Skin Color & Pigmentation] : normal skin color and pigmentation [] : no rash [Impaired Insight] : insight and judgment were intact [Oriented To Time, Place, And Person] : oriented to person, place, and time [No Anxiety] : not feeling anxious [FreeTextEntry1] : walks with cane. mild right sided weakness.

## 2019-07-09 NOTE — HISTORY OF PRESENT ILLNESS
[FreeTextEntry1] : Pepper Peralat is a 79 year old female wit history of Hypertension, atrial fibrillation and TR comes for follow up visit. Denies any chest pain or palpitations. No shortness of breath on exertion. Compliant to medications and diet. Follows up with PCP. Walks with Cane.

## 2019-11-05 ENCOUNTER — APPOINTMENT (OUTPATIENT)
Dept: CARDIOLOGY | Facility: CLINIC | Age: 79
End: 2019-11-05
Payer: MEDICARE

## 2019-11-05 VITALS
WEIGHT: 183 LBS | BODY MASS INDEX: 29.41 KG/M2 | SYSTOLIC BLOOD PRESSURE: 136 MMHG | DIASTOLIC BLOOD PRESSURE: 80 MMHG | HEART RATE: 72 BPM | OXYGEN SATURATION: 97 % | HEIGHT: 66 IN

## 2019-11-05 PROCEDURE — 99214 OFFICE O/P EST MOD 30 MIN: CPT

## 2019-11-05 NOTE — DISCUSSION/SUMMARY
[FreeTextEntry1] : In a summary Pepper Peralta is an elderly female with hypertension, controlled. Continue current medications and 2 gm sodium diet. Chronic atrial fibrillation, rate controlled. On Eliquis. Continue healthy life style. Follow up in 4 months.

## 2019-11-05 NOTE — HISTORY OF PRESENT ILLNESS
[FreeTextEntry1] : Pepper Peralta is a 79 year old female with history of hypertension and chronic atrial fibrillation comes for follow up visit. Denies any chest pain or palpitations. No shortness of breath on exertion. Compliant to medications and diet. Follows up with PCP.

## 2020-03-24 ENCOUNTER — APPOINTMENT (OUTPATIENT)
Dept: CARDIOLOGY | Facility: CLINIC | Age: 80
End: 2020-03-24
Payer: MEDICARE

## 2020-03-24 ENCOUNTER — NON-APPOINTMENT (OUTPATIENT)
Age: 80
End: 2020-03-24

## 2020-03-24 VITALS
BODY MASS INDEX: 29.25 KG/M2 | HEART RATE: 85 BPM | SYSTOLIC BLOOD PRESSURE: 130 MMHG | WEIGHT: 182 LBS | DIASTOLIC BLOOD PRESSURE: 80 MMHG | HEIGHT: 66 IN | OXYGEN SATURATION: 97 %

## 2020-03-24 VITALS
BODY MASS INDEX: 29.25 KG/M2 | HEART RATE: 85 BPM | SYSTOLIC BLOOD PRESSURE: 130 MMHG | DIASTOLIC BLOOD PRESSURE: 80 MMHG | WEIGHT: 182 LBS | OXYGEN SATURATION: 97 % | RESPIRATION RATE: 14 BRPM | HEIGHT: 66 IN

## 2020-03-24 VITALS
BODY MASS INDEX: 29.25 KG/M2 | HEART RATE: 85 BPM | HEIGHT: 66 IN | WEIGHT: 182 LBS | OXYGEN SATURATION: 97 % | RESPIRATION RATE: 14 BRPM | SYSTOLIC BLOOD PRESSURE: 130 MMHG | DIASTOLIC BLOOD PRESSURE: 80 MMHG

## 2020-03-24 PROCEDURE — 93306 TTE W/DOPPLER COMPLETE: CPT

## 2020-03-24 PROCEDURE — 99214 OFFICE O/P EST MOD 30 MIN: CPT

## 2020-03-24 PROCEDURE — 93000 ELECTROCARDIOGRAM COMPLETE: CPT

## 2020-03-24 NOTE — DISCUSSION/SUMMARY
[FreeTextEntry1] : In a summary Pepper Peralta is an elderly female with hypertension, controlled. Continue current medications and 2 gm sodium diet. Atrial fibrillation, rate controlled. On Eliquis. Moderate- severe TR, Pulmonary hypertension and mild shortness of breath on exertion, will get echo to assess LV systolic function and pulmonary hypertension. Healthy lifestyle. Follow up in 4 months.

## 2020-03-24 NOTE — REVIEW OF SYSTEMS
[Shortness Of Breath] : shortness of breath [Chest Pain] : no chest pain [Lower Ext Edema] : no extremity edema [Palpitations] : no palpitations [Joint Pain] : no joint pain [Joint Swelling] : no joint swelling [Easy Bleeding] : no tendency for easy bleeding [Easy Bruising] : no tendency for easy bruising

## 2020-07-21 ENCOUNTER — APPOINTMENT (OUTPATIENT)
Dept: CARDIOLOGY | Facility: CLINIC | Age: 80
End: 2020-07-21
Payer: MEDICARE

## 2020-07-21 ENCOUNTER — NON-APPOINTMENT (OUTPATIENT)
Age: 80
End: 2020-07-21

## 2020-07-21 VITALS
BODY MASS INDEX: 29.73 KG/M2 | DIASTOLIC BLOOD PRESSURE: 78 MMHG | SYSTOLIC BLOOD PRESSURE: 124 MMHG | OXYGEN SATURATION: 98 % | WEIGHT: 185 LBS | HEIGHT: 66 IN | HEART RATE: 76 BPM

## 2020-07-21 PROCEDURE — 99214 OFFICE O/P EST MOD 30 MIN: CPT

## 2020-07-21 PROCEDURE — 93000 ELECTROCARDIOGRAM COMPLETE: CPT

## 2020-07-21 NOTE — PHYSICAL EXAM
[General Appearance - Well Developed] : well developed [Well Groomed] : well groomed [General Appearance - Well Nourished] : well nourished [General Appearance - In No Acute Distress] : no acute distress [Normal Conjunctiva] : the conjunctiva exhibited no abnormalities [Eyelids - No Xanthelasma] : the eyelids demonstrated no xanthelasmas [Normal Oral Mucosa] : normal oral mucosa [No Oral Pallor] : no oral pallor [Normal Oropharynx] : normal oropharynx [Auscultation Breath Sounds / Voice Sounds] : lungs were clear to auscultation bilaterally [Respiration, Rhythm And Depth] : normal respiratory rhythm and effort [Heart Rate And Rhythm] : heart rate and rhythm were normal [Edema] : no peripheral edema present [Bowel Sounds] : normal bowel sounds [Abdomen Soft] : soft [Abdomen Tenderness] : non-tender [Nail Clubbing] : no clubbing of the fingernails [Cyanosis, Localized] : no localized cyanosis [Skin Color & Pigmentation] : normal skin color and pigmentation [] : no rash [Oriented To Time, Place, And Person] : oriented to person, place, and time [Impaired Insight] : insight and judgment were intact [No Anxiety] : not feeling anxious [FreeTextEntry1] : walks with cane. mild right sided weakness.

## 2020-07-21 NOTE — REVIEW OF SYSTEMS
[Shortness Of Breath] : shortness of breath [Limb Weakness (Paresis)] : limb weakness [Negative] : Endocrine [Chest Pain] : no chest pain [Lower Ext Edema] : no extremity edema [Palpitations] : no palpitations [Joint Pain] : no joint pain [Joint Swelling] : no joint swelling [Easy Bleeding] : no tendency for easy bleeding [Easy Bruising] : no tendency for easy bruising

## 2020-07-21 NOTE — HISTORY OF PRESENT ILLNESS
[FreeTextEntry1] : Pepper Peralta is a 80 year old female with history of hypertension, chronic atrial fibrillation and severe TR comes for follow up visit. Denies any chest pain or palpitations. Chronic mild on and off shortness of breath on exertion which is relieved with rest in few minutes. Compliant to medications and diet.

## 2020-07-21 NOTE — REASON FOR VISIT
[Hypertension] : hypertension [Atrial Fibrillation] : atrial fibrillation [Follow-Up - Clinic] : a clinic follow-up of

## 2020-07-21 NOTE — DISCUSSION/SUMMARY
[FreeTextEntry1] : In a summary Pepper Peralta is an elderly female with history of hypertension, controlled. Continue current medications and 2 gm sodium diet. Atrial fibrillation, rate controlled. On Eliquis. Tricuspid regurgitation, on Lasix. Continue healthy lifestyle. Follow up in 4 months.

## 2020-12-16 ENCOUNTER — NON-APPOINTMENT (OUTPATIENT)
Age: 80
End: 2020-12-16

## 2020-12-16 ENCOUNTER — APPOINTMENT (OUTPATIENT)
Dept: CARDIOLOGY | Facility: CLINIC | Age: 80
End: 2020-12-16
Payer: MEDICARE

## 2020-12-16 VITALS
HEART RATE: 72 BPM | WEIGHT: 186.38 LBS | HEIGHT: 66 IN | TEMPERATURE: 98 F | OXYGEN SATURATION: 98 % | BODY MASS INDEX: 29.95 KG/M2 | SYSTOLIC BLOOD PRESSURE: 122 MMHG | DIASTOLIC BLOOD PRESSURE: 76 MMHG

## 2020-12-16 PROCEDURE — 99072 ADDL SUPL MATRL&STAF TM PHE: CPT

## 2020-12-16 PROCEDURE — 99214 OFFICE O/P EST MOD 30 MIN: CPT

## 2020-12-16 PROCEDURE — 93000 ELECTROCARDIOGRAM COMPLETE: CPT

## 2020-12-16 NOTE — DISCUSSION/SUMMARY
[FreeTextEntry1] : In a summary Pepper Peralta is an elderly female with hypertension, controlled. Continue current medications and 2 gm sodium diet. Atrial fibrillation, rate controlled and on Eliquis. Moderate- severe TR, will monitor. Follow up in 6 months.

## 2020-12-16 NOTE — REVIEW OF SYSTEMS
[Limb Weakness (Paresis)] : limb weakness [Negative] : Endocrine [Shortness Of Breath] : no shortness of breath [Chest Pain] : no chest pain [Lower Ext Edema] : no extremity edema [Palpitations] : no palpitations [Joint Pain] : no joint pain [Joint Swelling] : no joint swelling [Easy Bleeding] : no tendency for easy bleeding [Easy Bruising] : no tendency for easy bruising

## 2020-12-16 NOTE — HISTORY OF PRESENT ILLNESS
[FreeTextEntry1] : Pepper Peralta is a 80 year old female with hypertension, atrial fibrillation and moderate- severe TR comes for follow up visit. Denies any chest pain or shortness of breath. No palpitations. Compliant to medications. Follows up with PCP regularly.

## 2021-06-14 ENCOUNTER — NON-APPOINTMENT (OUTPATIENT)
Age: 81
End: 2021-06-14

## 2021-06-14 ENCOUNTER — APPOINTMENT (OUTPATIENT)
Dept: CARDIOLOGY | Facility: CLINIC | Age: 81
End: 2021-06-14
Payer: MEDICARE

## 2021-06-14 VITALS
HEART RATE: 76 BPM | HEIGHT: 66 IN | SYSTOLIC BLOOD PRESSURE: 126 MMHG | BODY MASS INDEX: 30.05 KG/M2 | TEMPERATURE: 98.2 F | OXYGEN SATURATION: 95 % | DIASTOLIC BLOOD PRESSURE: 72 MMHG | WEIGHT: 187 LBS

## 2021-06-14 PROCEDURE — 93000 ELECTROCARDIOGRAM COMPLETE: CPT

## 2021-06-14 PROCEDURE — 99213 OFFICE O/P EST LOW 20 MIN: CPT

## 2021-06-14 PROCEDURE — 93306 TTE W/DOPPLER COMPLETE: CPT

## 2021-06-14 NOTE — HISTORY OF PRESENT ILLNESS
[FreeTextEntry1] : Pepper Peralta is a 81 year old female with hypertension, controlled. Continue current medications and 2 gm sodium diet. Atrial fibrillation, rate controlled. Continue Eliquis. Shortness of breath and TR, Echo done showed normal LV systolic function, moderate- severe  TR and moderate- severe pulmonary systolic function which is unchanged from last Echo. Continue current medications. Follow up in 6 months.

## 2021-06-14 NOTE — REVIEW OF SYSTEMS
[Dyspnea on exertion] : dyspnea during exertion [Joint Pain] : joint pain [Limb Weakness (Paresis)] : limb weakness (Paresis) [Negative] : Constitutional [Blurry Vision] : no blurred vision [Eye Pain] : no eye pain [Earache] : no earache [Sore Throat] : no sore throat [Lower Ext Edema] : no extremity edema [Chest Discomfort] : no chest discomfort [Palpitations] : no palpitations [Orthopnea] : no orthopnea [PND] : no PND [Cough] : no cough [Wheezing] : no wheezing [Abdominal Pain] : no abdominal pain [Nausea] : no nausea [Vomiting] : no vomiting [Heartburn] : no heartburn [Dysuria] : no dysuria [Rash] : no rash [Itching] : no itching [Change In Color Of Skin] : change in skin color [Skin Lesions] : no skin lesions [Dizziness] : no dizziness [Tremor] : no tremor was seen [Numbness (Hypoesthesia)] : no numbness [Confusion] : no confusion was observed [Memory Lapses Or Loss] : no memory lapses or loss [Anxiety] : no anxiety [Easy Bleeding] : no tendency for easy bleeding [Easy Bruising] : no tendency for easy bruising

## 2021-06-14 NOTE — PHYSICAL EXAM
[Normal Conjunctiva] : normal conjunctiva [No Carotid Bruit] : no carotid bruit [Normal S1, S2] : normal S1, S2 [Soft] : abdomen soft [Non Tender] : non-tender [Normal Bowel Sounds] : normal bowel sounds [No Edema] : no edema [No Cyanosis] : no cyanosis [No Rash] : no rash [Normal] : alert and oriented, normal memory [de-identified] : 3/6 PSM at left sternal border. [de-identified] : walks with Cane.

## 2021-12-13 ENCOUNTER — NON-APPOINTMENT (OUTPATIENT)
Age: 81
End: 2021-12-13

## 2021-12-13 ENCOUNTER — APPOINTMENT (OUTPATIENT)
Dept: CARDIOLOGY | Facility: CLINIC | Age: 81
End: 2021-12-13
Payer: MEDICARE

## 2021-12-13 VITALS
WEIGHT: 186 LBS | HEART RATE: 60 BPM | HEIGHT: 66 IN | SYSTOLIC BLOOD PRESSURE: 112 MMHG | TEMPERATURE: 97 F | OXYGEN SATURATION: 95 % | BODY MASS INDEX: 29.89 KG/M2 | DIASTOLIC BLOOD PRESSURE: 70 MMHG

## 2021-12-13 PROCEDURE — 93000 ELECTROCARDIOGRAM COMPLETE: CPT

## 2021-12-13 PROCEDURE — 99214 OFFICE O/P EST MOD 30 MIN: CPT

## 2021-12-13 NOTE — HISTORY OF PRESENT ILLNESS
[FreeTextEntry1] : Pepper Peralta is a 81 year old female with Hypertension, atrial fibrillation, history of Stroke with right sided residual weakness and TR comes for follow up visit. Denies any chest pain or shortness of breath. No palpitations. Compliant to medications and diet. Follows up with PCP. Walks with cane.

## 2021-12-13 NOTE — PHYSICAL EXAM
[Normal Conjunctiva] : normal conjunctiva [No Carotid Bruit] : no carotid bruit [Soft] : abdomen soft [Non Tender] : non-tender [Normal Bowel Sounds] : normal bowel sounds [No Edema] : no edema [No Cyanosis] : no cyanosis [No Rash] : no rash [Normal] : alert and oriented, normal memory [de-identified] : 3/6 PSM at left sternal border. s1s2 irregular. [de-identified] : walks with Cane. [de-identified] : mild residual weakness on right side.

## 2021-12-13 NOTE — DISCUSSION/SUMMARY
[FreeTextEntry1] : Pepper Peralta is a 81 year old female with hypertension, controlled. Continue current medications and 2 gm sodium diet. Atrial fibrillation, rate controlled. Continue Eliquis.  TR, Echo done in 6/2021  showed normal LV systolic function, moderate- severe  TR and moderate- severe pulmonary hypertension which is unchanged from last Echo. Continue current medications. Follow up in 4 months.

## 2021-12-13 NOTE — REVIEW OF SYSTEMS
[Joint Pain] : joint pain [Limb Weakness (Paresis)] : limb weakness (Paresis) [Negative] : Constitutional [Blurry Vision] : no blurred vision [Eye Pain] : no eye pain [Earache] : no earache [Sore Throat] : no sore throat [Dyspnea on exertion] : not dyspnea during exertion [Chest Discomfort] : no chest discomfort [Lower Ext Edema] : no extremity edema [Palpitations] : no palpitations [Orthopnea] : no orthopnea [PND] : no PND [Cough] : no cough [Wheezing] : no wheezing [Abdominal Pain] : no abdominal pain [Nausea] : no nausea [Vomiting] : no vomiting [Heartburn] : no heartburn [Dysuria] : no dysuria [Rash] : no rash [Itching] : no itching [Change In Color Of Skin] : change in skin color [Skin Lesions] : no skin lesions [Dizziness] : no dizziness [Tremor] : no tremor was seen [Numbness (Hypoesthesia)] : no numbness [Confusion] : no confusion was observed [Memory Lapses Or Loss] : no memory lapses or loss [Anxiety] : no anxiety [Easy Bleeding] : no tendency for easy bleeding [Easy Bruising] : no tendency for easy bruising

## 2022-04-12 ENCOUNTER — APPOINTMENT (OUTPATIENT)
Dept: CARDIOLOGY | Facility: CLINIC | Age: 82
End: 2022-04-12
Payer: MEDICARE

## 2022-04-12 VITALS
HEIGHT: 66 IN | SYSTOLIC BLOOD PRESSURE: 126 MMHG | OXYGEN SATURATION: 93 % | WEIGHT: 185 LBS | HEART RATE: 78 BPM | BODY MASS INDEX: 29.73 KG/M2 | TEMPERATURE: 97 F | DIASTOLIC BLOOD PRESSURE: 66 MMHG

## 2022-04-12 PROCEDURE — 99214 OFFICE O/P EST MOD 30 MIN: CPT

## 2022-04-12 NOTE — PHYSICAL EXAM
[Normal Conjunctiva] : normal conjunctiva [No Carotid Bruit] : no carotid bruit [Soft] : abdomen soft [Non Tender] : non-tender [Normal Bowel Sounds] : normal bowel sounds [No Edema] : no edema [No Cyanosis] : no cyanosis [No Rash] : no rash [Normal] : alert and oriented, normal memory [de-identified] : 3/6 PSM at left sternal border. s1s2 irregular. [de-identified] : walks with Cane. [de-identified] : mild residual weakness on right side.

## 2022-04-12 NOTE — DISCUSSION/SUMMARY
[FreeTextEntry1] : Pepper Peralta is a 81 year old female with hypertension, controlled. Continue Vasotec, Carvedilol, Catapres, Lasix, Amlodipine  and 2 gm sodium diet. Atrial fibrillation, rate controlled. Continue Eliquis. Continue current medications. Follow up in 4 months.

## 2022-08-16 ENCOUNTER — APPOINTMENT (OUTPATIENT)
Dept: CARDIOLOGY | Facility: CLINIC | Age: 82
End: 2022-08-16

## 2022-08-16 ENCOUNTER — NON-APPOINTMENT (OUTPATIENT)
Age: 82
End: 2022-08-16

## 2022-08-16 VITALS
DIASTOLIC BLOOD PRESSURE: 74 MMHG | WEIGHT: 185 LBS | SYSTOLIC BLOOD PRESSURE: 126 MMHG | BODY MASS INDEX: 29.73 KG/M2 | HEIGHT: 66 IN | HEART RATE: 83 BPM | OXYGEN SATURATION: 96 %

## 2022-08-16 PROCEDURE — 93000 ELECTROCARDIOGRAM COMPLETE: CPT

## 2022-08-16 PROCEDURE — 93306 TTE W/DOPPLER COMPLETE: CPT

## 2022-08-16 PROCEDURE — 99214 OFFICE O/P EST MOD 30 MIN: CPT | Mod: 25

## 2022-08-16 NOTE — DISCUSSION/SUMMARY
[FreeTextEntry1] : Pepper Peralta is an elderly  female with hypertension, controlled. Continue Vasotec, Carvedilol, Catapres, Lasix, Amlodipine  and 2 gm sodium diet. Atrial fibrillation, rate controlled. Continue Eliquis. Continue current medications. Hypertension and Shortness of breath, Echo done showed normal LV systolic function, moderate- severe TR and moderate- severe Pulmonary hypertension unchanged from previous Echo. Echo results explained.  Follow up in 6 months.

## 2022-08-16 NOTE — PHYSICAL EXAM
[Normal Conjunctiva] : normal conjunctiva [No Carotid Bruit] : no carotid bruit [Soft] : abdomen soft [Non Tender] : non-tender [Normal Bowel Sounds] : normal bowel sounds [No Edema] : no edema [No Cyanosis] : no cyanosis [No Rash] : no rash [Normal] : alert and oriented, normal memory [de-identified] : 3/6 PSM at left sternal border. s1s2 irregular. [de-identified] : walks with Cane. [de-identified] : mild residual weakness on right side.

## 2022-08-16 NOTE — REVIEW OF SYSTEMS
[Dyspnea on exertion] : dyspnea during exertion [Joint Pain] : joint pain [Limb Weakness (Paresis)] : limb weakness (Paresis) [Negative] : Constitutional [Blurry Vision] : no blurred vision [Eye Pain] : no eye pain [Earache] : no earache [Sore Throat] : no sore throat [Chest Discomfort] : no chest discomfort [Lower Ext Edema] : no extremity edema [Palpitations] : no palpitations [Orthopnea] : no orthopnea [PND] : no PND [Cough] : no cough [Wheezing] : no wheezing [Abdominal Pain] : no abdominal pain [Nausea] : no nausea [Vomiting] : no vomiting [Heartburn] : no heartburn [Dysuria] : no dysuria [Rash] : no rash [Itching] : no itching [Change In Color Of Skin] : change in skin color [Skin Lesions] : no skin lesions [Dizziness] : no dizziness [Tremor] : no tremor was seen [Numbness (Hypoesthesia)] : no numbness [Confusion] : no confusion was observed [Memory Lapses Or Loss] : no memory lapses or loss [Anxiety] : no anxiety [Easy Bleeding] : no tendency for easy bleeding [Easy Bruising] : no tendency for easy bruising

## 2022-08-16 NOTE — HISTORY OF PRESENT ILLNESS
[FreeTextEntry1] : Pepper Peralta is a 82  year old female with Hypertension, atrial fibrillation, history of Stroke with right sided residual weakness and TR comes for follow up visit. Denies any chest pain. Mild chronic on and off  shortness of breath. No palpitations. Compliant to medications and diet. Follows up with PCP. Walks with cane.

## 2023-02-21 ENCOUNTER — APPOINTMENT (OUTPATIENT)
Dept: CARDIOLOGY | Facility: CLINIC | Age: 83
End: 2023-02-21
Payer: MEDICARE

## 2023-02-21 ENCOUNTER — NON-APPOINTMENT (OUTPATIENT)
Age: 83
End: 2023-02-21

## 2023-02-21 VITALS
DIASTOLIC BLOOD PRESSURE: 82 MMHG | SYSTOLIC BLOOD PRESSURE: 170 MMHG | HEART RATE: 74 BPM | HEIGHT: 66 IN | OXYGEN SATURATION: 94 % | BODY MASS INDEX: 29.41 KG/M2 | WEIGHT: 183 LBS

## 2023-02-21 VITALS — SYSTOLIC BLOOD PRESSURE: 136 MMHG | DIASTOLIC BLOOD PRESSURE: 76 MMHG

## 2023-02-21 PROCEDURE — 99214 OFFICE O/P EST MOD 30 MIN: CPT | Mod: 25

## 2023-02-21 PROCEDURE — 93000 ELECTROCARDIOGRAM COMPLETE: CPT

## 2023-02-21 NOTE — HISTORY OF PRESENT ILLNESS
[FreeTextEntry1] : Pepper Peralta is a 82  year old female with Hypertension, atrial fibrillation, history of Stroke with right sided residual weakness and moderate to severe  TR comes for follow up visit. Denies any chest pain. Mild chronic on and off  shortness of breath. No palpitations. Worsening leg edema. Compliant to medications and diet. Follows up with PCP. Walks with cane.

## 2023-02-21 NOTE — DISCUSSION/SUMMARY
[FreeTextEntry1] : Pepper Peralta is an elderly  female with hypertension, controlled. Continue Vasotec, Carvedilol, Catapres, Lasix  and 2 gm sodium diet.  Bilateral leg edema may be secondary to Amlodipine. Recommend to discontinue Amlodipine and adjust medications for BP control. Atrial fibrillation, rate controlled. Continue Eliquis.  Hypertension and Shortness of breath, Echo done 8/2022  showed normal LV systolic function, moderate- severe TR and moderate- severe Pulmonary hypertension unchanged from previous Echo. Echo results explained. Also explained her about structural heart disease consult for possible TV clip. She does not want any further evaluation at this time. Spoke with Dr. Biggs and discussed about Ms. Reddy. She said she will stop Amlodipine and increase Enalapril dose. On Eliquis 5 mg  because of  her weight and normal renal function.   Follow up in 6 months.

## 2023-02-21 NOTE — PHYSICAL EXAM
[Normal Conjunctiva] : normal conjunctiva [No Carotid Bruit] : no carotid bruit [Soft] : abdomen soft [Non Tender] : non-tender [Normal Bowel Sounds] : normal bowel sounds [No Cyanosis] : no cyanosis [No Rash] : no rash [Normal] : alert and oriented, normal memory [de-identified] : 3/6 PSM at left sternal border. s1s2 irregular. [de-identified] : walks with Cane. [de-identified] : 1+ bilateral edema.  [de-identified] : mild residual weakness on right side.

## 2023-02-21 NOTE — REVIEW OF SYSTEMS
[Dyspnea on exertion] : dyspnea during exertion [Lower Ext Edema] : lower extremity edema [Joint Pain] : joint pain [Limb Weakness (Paresis)] : limb weakness (Paresis) [Negative] : Constitutional [Blurry Vision] : no blurred vision [Eye Pain] : no eye pain [Earache] : no earache [Sore Throat] : no sore throat [Chest Discomfort] : no chest discomfort [Palpitations] : no palpitations [Orthopnea] : no orthopnea [PND] : no PND [Cough] : no cough [Wheezing] : no wheezing [Abdominal Pain] : no abdominal pain [Nausea] : no nausea [Vomiting] : no vomiting [Heartburn] : no heartburn [Dysuria] : no dysuria [Rash] : no rash [Itching] : no itching [Change In Color Of Skin] : change in skin color [Skin Lesions] : no skin lesions [Dizziness] : no dizziness [Tremor] : no tremor was seen [Numbness (Hypoesthesia)] : no numbness [Confusion] : no confusion was observed [Memory Lapses Or Loss] : no memory lapses or loss [Anxiety] : no anxiety [Easy Bleeding] : no tendency for easy bleeding [Easy Bruising] : no tendency for easy bruising

## 2023-03-07 ENCOUNTER — NON-APPOINTMENT (OUTPATIENT)
Age: 83
End: 2023-03-07

## 2023-03-08 ENCOUNTER — APPOINTMENT (OUTPATIENT)
Dept: HOME HEALTH SERVICES | Facility: HOME HEALTH | Age: 83
End: 2023-03-08
Payer: MEDICARE

## 2023-03-08 ENCOUNTER — APPOINTMENT (OUTPATIENT)
Dept: HOME HEALTH SERVICES | Facility: HOME HEALTH | Age: 83
End: 2023-03-08

## 2023-03-08 VITALS
TEMPERATURE: 98.6 F | DIASTOLIC BLOOD PRESSURE: 70 MMHG | HEART RATE: 64 BPM | SYSTOLIC BLOOD PRESSURE: 115 MMHG | OXYGEN SATURATION: 97 %

## 2023-03-08 DIAGNOSIS — K59.00 CONSTIPATION, UNSPECIFIED: ICD-10-CM

## 2023-03-08 PROCEDURE — 99344 HOME/RES VST NEW MOD MDM 60: CPT | Mod: 25

## 2023-03-08 PROCEDURE — G0506: CPT

## 2023-03-08 NOTE — HEALTH RISK ASSESSMENT
[Independent] : using telephone [Some assistance needed] : using transportation [Full assistance needed] : managing finances [No falls in past year] : Patient reported no falls in the past year [Yes] : The patient does have visual impairment [TimeGetUpGo] : 25

## 2023-03-08 NOTE — REASON FOR VISIT
[Initial Eval - Existing Diagnosis] : an initial evaluation of an existing diagnosis [Pre-Visit Preparation] : pre-visit preparation was done [Intercurrent Specialty/Sub-specialty Visits] : the patient has intercurrent specialty/sub-specialty visits [Formal Caregiver] : formal caregiver [FreeTextEntry2] : medical records [FreeTextEntry3] : cardiology

## 2023-03-08 NOTE — COUNSELING
[Overweight - ( BMI 25.1 - 29.9 )] : overweight -  ( BMI 25.1 - 29.9 ) [Sodium restriction 2gm recommended] : sodium restriction 2 gm recommended [Non - Smoker] : non-smoker [Use grab bars] : use grab bars [Use assistive device to avoid falls] : use assistive device to avoid falls [Remove clutter and unsafe carpeting to avoid falls] : remove clutter and unsafe carpeting to avoid falls [] : foot exam [Minimize unnecessary interventions] : minimize unnecessary interventions [Maintain functional ability] : maintain functional ability [Discussed disease trajectory with patient/caregiver] : discussed disease trajectory with patient/caregiver [Annual Discussion and review of: ___] : Annual discussion and review of [unfilled] [DNR] : Code Status: DNR [Limited] : Treatment Guidelines: Limited [DNI] : Intubation: DNI [[facility] Date: _____] [facility] Completion/last verification date: [unfilled] [_____] : HCP: [unfilled] [ - New patient with 2 or more chronic conditions; CCM discussed and patient-centered care plan established] : New patient with 2 or more chronic conditions; CCM discussed and patient-centered care plan established

## 2023-03-08 NOTE — PHYSICAL EXAM
[No Acute Distress] : no acute distress [Normal Sclera/Conjunctiva] : normal sclera/conjunctiva [Normal Outer Ear/Nose] : the ears and nose were normal in appearance [No JVD] : no jugular venous distention [Supple] : the neck was supple [No Respiratory Distress] : no respiratory distress [Clear to Auscultation] : lungs were clear to auscultation bilaterally [No Accessory Muscle Use] : no accessory muscle use [Normal Rate] : heart rate was normal  [Regular Rhythm] : with a regular rhythm [Normal S1, S2] : normal S1 and S2 [No Murmurs] : no murmurs heard [Normal Bowel Sounds] : normal bowel sounds [Non Tender] : non-tender [Soft] : abdomen soft [No Rash] : no rash [No Skin Lesions] : no skin lesions [Oriented x3] : oriented to person, place, and time [Normal Affect] : the affect was normal [de-identified] : as done by RN and documented by me [de-identified] : +1 edema [de-identified] : Right foot drop and right hemiplegia  [de-identified] : right hemiplegia and aphasia

## 2023-03-08 NOTE — CHRONIC CARE ASSESSMENT
[PPS Score: ____] : Palliative Performance Scale (PPS) Score: [unfilled] [de-identified] : low sodium diet

## 2023-03-08 NOTE — HISTORY OF PRESENT ILLNESS
Levothyroxine Sodium (EUTHYROX) 200 MCG Oral Tab   Levothyroxine Sodium (EUTHYROX) 25 MCG Oral Tab   Call to [facility]. I did advise pt. She is due for appt. Because its been over 6 months but she said she has her wellness set for May and did not schedule. [In-Place] : has aide services in-place [A] : A [Patient is stable - had PCP appoinment] : patient is stable - had PCP appointment [Patient] : patient [Formal Caregiver] : formal caregiver [LastPCPVisitDate] : 1/2023 [FreeTextEntry4] : Dr Dent [LastSpecialistVisitDate] : 2/2023 [FreeTextEntry1] : n/a  [FreeTextEntry2] : TIFFANY LIM is being seen for a visit provided via [email or web address] real-time audio visual technology. TIFFANY LIM was located at their home, [address]\par [address]\[place] [address], at the time of the visit.\par The [facility] clinician, JALEEL ESPINAL, was located remotely at their home in [place] at the time of the visit. The patient, TIFFANY LIM, and the [facility] clinician, JALEEL ESPINAL, participated in the telehealth encounter. Other participants included the RN, who was in the home with the patient, performed vitals monitoring and physical exam, and facilitated the video visit with JALEEL ESPINAL. The assessment and plan was made on the basis of the information provided by the RN.\par \par \par TIFFANY LIM is a 82 year female with PMH of prediabetes, HTN, AFib and CVA with right residual weakness being seen for initial evaluation. \par \par Interval Events\par -Seen by cardiology recently. Recommend D/C amlodipine (cause of edema) and adjust other BP meds. Also recommended TV clip. \par -Seen by PCP and amlodipine 10mg discontinued. No dose adjustment on other medications. \par \par \par Subjective:\par -Appetite/weight: good appetite.  solids consistency. No dysphagia. Weight stable. \par -Gait/falls: drags right foot when walking. Uses \par -Pain: bilateral knee pain mostly on right. Used to use lidocaine cream but now using Tylenol 1000mg Q6H prn pain. \par -Sleep: sleeps well. \par -BMs: daily BM. hard stools. no hematochezia. Uses Sennakot for constipation. \par -Urine: no concerns. continent.  uses diapers when going out. \par -Skin: intact \par -DME: cane, motorized wheelchair not working, shower chair, grab bars, commode\par -Mood/memory: mood is ok. Memory is good. \par -Communication: conversational. \par -Health Maintenance: above age of screening guidelines. Flu 1/2023.\par -Hospitalizations in the past year: none \par \par \par Medical Issues:\par a) Prediabetes: diet controlled. Will monitor. \par b) HTN: Chronic. Continue Clonidine and Enalapril. \par c) Afib:Chronic. Continue Coreg and Eliquis. FU with cardiology. 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 Unsure why not on statin. \par e) Tricuspid Regurgitation: Echo done 8/2022 showed normal LV systolic function, moderate- severe TR. Cardiology recommended TV clip but patient doesn't want any further evaluation. FU with cardiology. \par f) Pulmonary HTN: moderate to severe (echo 8/2022). No dyspnea reported. FU with cardiology. \par \par . \par Specialists:\par PCP: Dr Mary Alice Zapata\par Cardiologist: Dr Iwona Dent\par \par \par Social hx: lives alone. 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 . \par Caregivers: 24/7 [employer]HA\par MOLST: DNR, DNI, Hospitalize, No feeding tube, Antibiotics and IV ok (signed 9/16/2022) \par HCP: Salomon Beebe and Keon Beebe \par

## 2023-03-08 NOTE — DATA REVIEWED
[FreeTextEntry1] : Echo reviewed: (8/16/2022)\par - Normal LV function, moderate to severe TR, moderate to severe pulmonary hypertension

## 2023-04-11 ENCOUNTER — NON-APPOINTMENT (OUTPATIENT)
Age: 83
End: 2023-04-11

## 2023-04-18 ENCOUNTER — APPOINTMENT (OUTPATIENT)
Dept: HOME HEALTH SERVICES | Facility: HOME HEALTH | Age: 83
End: 2023-04-18
Payer: MEDICARE

## 2023-04-18 VITALS
HEART RATE: 62 BPM | TEMPERATURE: 97.4 F | RESPIRATION RATE: 14 BRPM | OXYGEN SATURATION: 98 % | DIASTOLIC BLOOD PRESSURE: 68 MMHG | SYSTOLIC BLOOD PRESSURE: 136 MMHG

## 2023-04-18 PROCEDURE — 99349 HOME/RES VST EST MOD MDM 40: CPT

## 2023-04-18 NOTE — COUNSELING
[Overweight - ( BMI 25.1 - 29.9 )] : overweight -  ( BMI 25.1 - 29.9 ) [Sodium restriction 2gm recommended] : sodium restriction 2 gm recommended [Non - Smoker] : non-smoker [Use grab bars] : use grab bars [Use assistive device to avoid falls] : use assistive device to avoid falls [Remove clutter and unsafe carpeting to avoid falls] : remove clutter and unsafe carpeting to avoid falls [] : foot exam [Minimize unnecessary interventions] : minimize unnecessary interventions [Maintain functional ability] : maintain functional ability [Discussed disease trajectory with patient/caregiver] : discussed disease trajectory with patient/caregiver [Annual Discussion and review of: ___] : Annual discussion and review of [unfilled] [DNR] : Code Status: DNR [Limited] : Treatment Guidelines: Limited [DNI] : Intubation: DNI [[facility]: _____] [facility]last verification date: [unfilled] [_____] : HCP: [unfilled]

## 2023-04-20 ENCOUNTER — LABORATORY RESULT (OUTPATIENT)
Age: 83
End: 2023-04-20

## 2023-04-28 ENCOUNTER — NON-APPOINTMENT (OUTPATIENT)
Age: 83
End: 2023-04-28

## 2023-05-01 ENCOUNTER — FORM ENCOUNTER (OUTPATIENT)
Age: 83
End: 2023-05-01

## 2023-05-01 ENCOUNTER — NON-APPOINTMENT (OUTPATIENT)
Age: 83
End: 2023-05-01

## 2023-05-02 ENCOUNTER — APPOINTMENT (OUTPATIENT)
Dept: HOME HEALTH SERVICES | Facility: HOME HEALTH | Age: 83
End: 2023-05-02

## 2023-05-08 ENCOUNTER — NON-APPOINTMENT (OUTPATIENT)
Age: 83
End: 2023-05-08

## 2023-05-10 NOTE — HISTORY OF PRESENT ILLNESS
[In-Place] : has aide services in-place [A] : A [Patient is stable - had PCP appoinment] : patient is stable - had PCP appointment [Patient] : patient [Formal Caregiver] : formal caregiver [LastPCPVisitDate] : 1/2023 [FreeTextEntry4] : Dr Dent [LastSpecialistVisitDate] : 2/2023 [FreeTextEntry1] : n/a  [FreeTextEntry2] : TIFFANY LIM is a 82 year female with PMH of prediabetes, HTN, AFib and CVA with right residual weakness being seen for follow up. \par \par Interval Events\par -PCP visit scheduled in May \par -Cardiology appointment scheduled in August. \par -BP has been fine at home. BP checked once daily \par \par \par Subjective:\par -Appetite/weight: good appetite.  solids consistency. No dysphagia. Weight stable. \par -Gait/falls: drags right foot when walking. Uses cane walking. No falls. \par -Pain: bilateral knee pain mostly on right. Used to use lidocaine cream but now using Tylenol 1000mg Q6H prn pain. \par -Sleep: sleeps well. \par -BMs: daily BM. no hematochezia. Uses Sennakot for constipation. \par -Urine: no concerns. continent.  uses diapers when going out. \par -Skin: intact \par -DME: cane, motorized wheelchair not working, shower chair, grab bars, commode\par -Mood/memory: mood is ok. Short term memory issues at times.  \par -Communication: conversational. \par -Health Maintenance: above age of screening guidelines. UTD on vaccines. \par -Hospitalizations in the past year: none \par \par \par Medical Issues:\par a) Prediabetes: diet controlled. Will monitor. \par b) HTN: Chronic. Continue Clonidine and Enalapril. \par c) Afib:Chronic. Continue Coreg and Eliquis. FU with cardiology. 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 Unsure why not on statin. patient denies allergy to statin. Discussed with Dr Toney if any contraindication, lipids being managed by Dr Zapata. Will send for lipid panel. \par e) Tricuspid Regurgitation: Echo done 8/2022 showed normal LV systolic function, moderate- severe TR. Cardiology recommended TV clip but patient doesn't want any further evaluation. FU with cardiology. \par f) Pulmonary HTN: moderate to severe (echo 8/2022). No dyspnea reported. FU with cardiology. \par \par . \par Specialists:\par PCP: Dr Mary Alice Zapata\par Cardiologist: Dr Iwona Dent\par \par \par Social hx: lives alone. 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 . \par Caregivers: 24/7 HHA\par MOLST: DNR, DNI, Hospitalize, No feeding tube, Antibiotics and IV ok. Reviewed 3/8/2023.  \par HCP: Salomon Beebe and Keon Beebe \par \par NEED FOR MOTORIZED WHEELCHAIR: \par -Symptoms limiting ambulation: Right hemiplegia with right foot drop\par -Diagnoses responsible for symptoms: CVA\par -Medications & other treatments for these symptoms: none, only prevention of recurrence of stroke. \par -Progression of ambulation difficulty over time? no progression, limitation is static. \par -Other diagnoses related to ambulatory problems: knee pain due to stress on joints caused by hemiplegia \par -Ambulatory distance with cane/walker: 6-10 feet \par -Pace of ambulation: slow \par -History of falls: no falls recently\par -Current ambulation assistance & why it isn't sufficient: Uses cane but can only transverse short distances\par -What has changed requiring use of motor chair: patient would like not to be limited by physical distance when out of home\par -Ability to use a manual wheelchair:  limited due to right sided weakness thus can't mobilize wheel with her hand/arm\par

## 2023-05-10 NOTE — REASON FOR VISIT
[Follow-Up] : a follow-up visit [Pre-Visit Preparation] : pre-visit preparation was done [Formal Caregiver] : formal caregiver [FreeTextEntry2] : medical records

## 2023-05-10 NOTE — PHYSICAL EXAM
[de-identified] : +1 edema [No Acute Distress] : no acute distress [Normal Voice/Communication] : normal voice communication [Normal Sclera/Conjunctiva] : normal sclera/conjunctiva [PERRL] : pupils equal, round and reactive to light [EOMI] : extra ocular movement intact [Normal Outer Ear/Nose] : the ears and nose were normal in appearance [Normal Oropharynx] : the oropharynx was normal [No JVD] : no jugular venous distention [Supple] : the neck was supple [No Respiratory Distress] : no respiratory distress [Clear to Auscultation] : lungs were clear to auscultation bilaterally [No Accessory Muscle Use] : no accessory muscle use [Normal Rate] : heart rate was normal  [Regular Rhythm] : with a regular rhythm [Normal S1, S2] : normal S1 and S2 [No Murmurs] : no murmurs heard [No Edema] : there was no peripheral edema [Normal Bowel Sounds] : normal bowel sounds [Non Tender] : non-tender [Soft] : abdomen soft [Not Distended] : not distended [No CVA Tenderness] : no ~M costovertebral angle tenderness [No Spinal Tenderness] : no spinal tenderness [No Joint Swelling] : no joint swelling seen [No Rash] : no rash [No Skin Lesions] : no skin lesions [Oriented x3] : oriented to person, place, and time [Normal Affect] : the affect was normal [de-identified] : dysarthria  [de-identified] : Right sided weakness and right foot drop [de-identified] : Loss of nasolabial fold on right side of face, 3+ strength in RUE and RLE

## 2023-05-10 NOTE — CHRONIC CARE ASSESSMENT
[PPS Score: ____] : Palliative Performance Scale (PPS) Score: [unfilled] [de-identified] : low sodium diet

## 2023-05-10 NOTE — HEALTH RISK ASSESSMENT
[Independent] : using telephone [Some assistance needed] : using transportation [Full assistance needed] : managing finances [No falls in past year] : Patient reported no falls in the past year [Yes] : The patient does have visual impairment [HRA Reviewed] : Health risk assessment reviewed [TimeGetUpGo] : 25

## 2023-06-13 ENCOUNTER — NON-APPOINTMENT (OUTPATIENT)
Age: 83
End: 2023-06-13

## 2023-06-19 ENCOUNTER — APPOINTMENT (OUTPATIENT)
Dept: HOME HEALTH SERVICES | Facility: HOME HEALTH | Age: 83
End: 2023-06-19
Payer: MEDICARE

## 2023-06-19 VITALS
DIASTOLIC BLOOD PRESSURE: 80 MMHG | RESPIRATION RATE: 14 BRPM | HEART RATE: 63 BPM | SYSTOLIC BLOOD PRESSURE: 158 MMHG | TEMPERATURE: 98.2 F | OXYGEN SATURATION: 98 %

## 2023-06-19 DIAGNOSIS — R06.02 SHORTNESS OF BREATH: ICD-10-CM

## 2023-06-19 DIAGNOSIS — I69.30 UNSPECIFIED SEQUELAE OF CEREBRAL INFARCTION: ICD-10-CM

## 2023-06-19 DIAGNOSIS — I07.1 RHEUMATIC TRICUSPID INSUFFICIENCY: ICD-10-CM

## 2023-06-19 DIAGNOSIS — I27.20 PULMONARY HYPERTENSION, UNSPECIFIED: ICD-10-CM

## 2023-06-19 PROCEDURE — 99349 HOME/RES VST EST MOD MDM 40: CPT

## 2023-06-22 PROBLEM — R06.02 SHORTNESS OF BREATH ON EXERTION: Status: RESOLVED | Noted: 2017-10-19 | Resolved: 2023-06-22

## 2023-06-22 PROBLEM — I69.30 HISTORY OF CEREBROVASCULAR ACCIDENT WITH RESIDUAL DEFICIT: Status: ACTIVE | Noted: 2023-03-08

## 2023-06-22 PROBLEM — I27.20 PULMONARY HYPERTENSION, MODERATE TO SEVERE: Status: ACTIVE | Noted: 2023-03-08

## 2023-06-22 NOTE — HISTORY OF PRESENT ILLNESS
[In-Place] : has aide services in-place [A] : A [Patient is stable - had PCP appoinment] : patient is stable - had PCP appointment [Patient] : patient [Formal Caregiver] : formal caregiver [LastPVisitDate] : 5/2023 [FreeTextEntry4] : Dr Dent [LastSpecialistVisitDate] : 2/2023 [FreeTextEntry1] : n/a  [FreeTextEntry2] : TIFFANY LIM is a 82 year female with PMH of prediabetes, HTN, AFib and CVA with right residual weakness being seen for follow up. \par \par Interval Events\par -PCP visit done in May. No new changes. \par -Reviewed lab results and questions answered. \par -Cardiology appointment scheduled in August. \par -Per [facility] BP prior to medication in am is elevated. Today was SBP 186mmHg. Per [facility] last office visit at PCP BP elevated but no med changes. Amlodipine in past discontinued due to edema. \par -Wheelchair still not functional. Replacement not delivered, technician will call when chair is delivered. \par \par \par Subjective:\par -Appetite/weight: good appetite.  solids consistency. No dysphagia. Weight stable. \par -Gait/falls: drags right foot when walking. Uses cane walking. No falls. \par -Pain: bilateral knee pain mostly on right. Used to use lidocaine cream but now using Tylenol 1000mg Q6H prn pain. \par -Sleep: sleeps well. \par -BMs: daily BM. no hematochezia. Uses Sennakot for constipation. \par -Urine: no concerns. continent.  uses diapers when going out. \par -Skin: intact \par -DME: cane, motorized wheelchair not working, shower chair, grab bars, commode\par -Mood/memory: mood is ok. Short term memory issues at times.  \par -Communication: conversational. \par -Health Maintenance: above age of screening guidelines. UTD on vaccines. \par -Hospitalizations in the past year: none \par \par \par Medical Issues:\par a) Prediabetes: A1c 6.1% (4/2023). continue diet control. \par b) HTN: Chronic. Uncontrolled. Will increase clonidine to 0.3 mg BID and continue Enalapril. BP check in two weeks. \par c) Afib: Chronic. Continue Coreg and Eliquis. FU with cardiology. 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 Unsure why not on statin. patient denies allergy to statin. Discussed with Dr Toney if any contraindication, lipids being managed by Dr Zapata. Lipid panel normal in a non diabetic. Will hold off on initiating. \par e) Tricuspid Regurgitation: Echo done 8/2022 showed normal LV systolic function, moderate- severe TR. Cardiology recommended TV clip but patient doesn't want any further evaluation. FU with cardiology. \par f) Pulmonary HTN: moderate to severe (echo 8/2022). No dyspnea reported. FU with cardiology. \par \par . \par Specialists:\par PCP: Dr Mary Alice Zapata\par Cardiologist: Dr Iwona Dent\par \par \par Social hx: lives alone. 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 . \par Caregivers: 24/7 [facility]\par MOLST: DNR, DNI, Hospitalize, No feeding tube, Antibiotics and IV ok. Reviewed 3/8/2023.  \par HCP: Salomon Beebe and Keon Beebe \par \par NEED FOR MOTORIZED WHEELCHAIR: \par -Symptoms limiting ambulation: Right hemiplegia with right foot drop\par -Diagnoses responsible for symptoms: CVA\par -Medications & other treatments for these symptoms: none, only prevention of recurrence of stroke. \par -Progression of ambulation difficulty over time? no progression, limitation is static. \par -Other diagnoses related to ambulatory problems: knee pain due to stress on joints caused by hemiplegia \par -Ambulatory distance with cane/walker: 6-10 feet \par -Pace of ambulation: slow \par -History of falls: no falls recently\par -Current ambulation assistance & why it isn't sufficient: Uses cane but can only transverse short distances\par -What has changed requiring use of motor chair: patient would like not to be limited by physical distance when out of home\par -Ability to use a manual wheelchair:  limited due to right sided weakness thus can't mobilize wheel with her hand/arm\par

## 2023-06-22 NOTE — PHYSICAL EXAM
[No Acute Distress] : no acute distress [Normal Voice/Communication] : normal voice communication [Normal Sclera/Conjunctiva] : normal sclera/conjunctiva [PERRL] : pupils equal, round and reactive to light [EOMI] : extra ocular movement intact [Normal Outer Ear/Nose] : the ears and nose were normal in appearance [Normal Oropharynx] : the oropharynx was normal [No JVD] : no jugular venous distention [Supple] : the neck was supple [No Respiratory Distress] : no respiratory distress [Clear to Auscultation] : lungs were clear to auscultation bilaterally [No Accessory Muscle Use] : no accessory muscle use [Normal Rate] : heart rate was normal  [Regular Rhythm] : with a regular rhythm [Normal S1, S2] : normal S1 and S2 [No Murmurs] : no murmurs heard [No Edema] : there was no peripheral edema [Normal Bowel Sounds] : normal bowel sounds [Non Tender] : non-tender [Soft] : abdomen soft [Not Distended] : not distended [No CVA Tenderness] : no ~M costovertebral angle tenderness [No Spinal Tenderness] : no spinal tenderness [No Joint Swelling] : no joint swelling seen [No Rash] : no rash [No Skin Lesions] : no skin lesions [Oriented x3] : oriented to person, place, and time [Normal Affect] : the affect was normal [de-identified] : dysarthria  [de-identified] : Right sided weakness and right foot drop [de-identified] : Loss of nasolabial fold on right side of face, 3+ strength in RUE and RLE

## 2023-06-22 NOTE — REASON FOR VISIT
[Follow-Up] : a follow-up visit [Formal Caregiver] : formal caregiver [Pre-Visit Preparation] : pre-visit preparation was done [Intercurrent Specialty/Sub-specialty Visits] : the patient has intercurrent specialty/sub-specialty visits [FreeTextEntry2] : medical records [FreeTextEntry3] : PCP

## 2023-06-22 NOTE — HEALTH RISK ASSESSMENT
[HRA Reviewed] : Health risk assessment reviewed [Independent] : using telephone [Some assistance needed] : using transportation [Full assistance needed] : managing finances [No falls in past year] : Patient reported no falls in the past year [Yes] : The patient does have visual impairment [TimeGetUpGo] : 25

## 2023-06-22 NOTE — CHRONIC CARE ASSESSMENT
[PPS Score: ____] : Palliative Performance Scale (PPS) Score: [unfilled] [de-identified] : low sodium diet

## 2023-06-27 ENCOUNTER — TRANSCRIPTION ENCOUNTER (OUTPATIENT)
Age: 83
End: 2023-06-27

## 2023-06-27 ENCOUNTER — NON-APPOINTMENT (OUTPATIENT)
Age: 83
End: 2023-06-27

## 2023-06-28 ENCOUNTER — APPOINTMENT (OUTPATIENT)
Dept: HOME HEALTH SERVICES | Facility: HOME HEALTH | Age: 83
End: 2023-06-28

## 2023-06-28 VITALS
TEMPERATURE: 97.1 F | RESPIRATION RATE: 14 BRPM | OXYGEN SATURATION: 98 % | HEART RATE: 55 BPM | SYSTOLIC BLOOD PRESSURE: 160 MMHG | DIASTOLIC BLOOD PRESSURE: 80 MMHG

## 2023-07-11 ENCOUNTER — NON-APPOINTMENT (OUTPATIENT)
Age: 83
End: 2023-07-11

## 2023-07-12 ENCOUNTER — APPOINTMENT (OUTPATIENT)
Dept: HOME HEALTH SERVICES | Facility: HOME HEALTH | Age: 83
End: 2023-07-12

## 2023-07-12 VITALS
HEART RATE: 59 BPM | RESPIRATION RATE: 16 BRPM | DIASTOLIC BLOOD PRESSURE: 80 MMHG | OXYGEN SATURATION: 97 % | TEMPERATURE: 98.5 F | SYSTOLIC BLOOD PRESSURE: 165 MMHG

## 2023-08-21 ENCOUNTER — APPOINTMENT (OUTPATIENT)
Dept: CARDIOLOGY | Facility: CLINIC | Age: 83
End: 2023-08-21

## 2023-08-24 ENCOUNTER — APPOINTMENT (OUTPATIENT)
Dept: HOME HEALTH SERVICES | Facility: HOME HEALTH | Age: 83
End: 2023-08-24

## 2023-10-06 ENCOUNTER — NON-APPOINTMENT (OUTPATIENT)
Age: 83
End: 2023-10-06

## 2023-10-07 ENCOUNTER — TRANSCRIPTION ENCOUNTER (OUTPATIENT)
Age: 83
End: 2023-10-07

## 2023-10-07 ENCOUNTER — APPOINTMENT (OUTPATIENT)
Dept: HOME HEALTH SERVICES | Facility: HOME HEALTH | Age: 83
End: 2023-10-07
Payer: MEDICARE

## 2023-10-07 ENCOUNTER — INPATIENT (INPATIENT)
Facility: HOSPITAL | Age: 83
LOS: 5 days | Discharge: HOME HEALTH | End: 2023-10-13
Attending: STUDENT IN AN ORGANIZED HEALTH CARE EDUCATION/TRAINING PROGRAM | Admitting: STUDENT IN AN ORGANIZED HEALTH CARE EDUCATION/TRAINING PROGRAM
Payer: MEDICARE

## 2023-10-07 ENCOUNTER — NON-APPOINTMENT (OUTPATIENT)
Age: 83
End: 2023-10-07

## 2023-10-07 VITALS
OXYGEN SATURATION: 95 % | TEMPERATURE: 99 F | DIASTOLIC BLOOD PRESSURE: 99 MMHG | SYSTOLIC BLOOD PRESSURE: 223 MMHG | HEART RATE: 70 BPM | RESPIRATION RATE: 19 BRPM

## 2023-10-07 DIAGNOSIS — E07.9 DISORDER OF THYROID, UNSPECIFIED: Chronic | ICD-10-CM

## 2023-10-07 DIAGNOSIS — R47.1 DYSARTHRIA AND ANARTHRIA: ICD-10-CM

## 2023-10-07 DIAGNOSIS — Z90.710 ACQUIRED ABSENCE OF BOTH CERVIX AND UTERUS: Chronic | ICD-10-CM

## 2023-10-07 DIAGNOSIS — G45.9 TRANSIENT CEREBRAL ISCHEMIC ATTACK, UNSPECIFIED: ICD-10-CM

## 2023-10-07 LAB
ALBUMIN SERPL ELPH-MCNC: 4.6 G/DL — SIGNIFICANT CHANGE UP (ref 3.3–5)
ALP SERPL-CCNC: 97 U/L — SIGNIFICANT CHANGE UP (ref 40–120)
ALT FLD-CCNC: 18 U/L — SIGNIFICANT CHANGE UP (ref 4–33)
ANION GAP SERPL CALC-SCNC: 16 MMOL/L — HIGH (ref 7–14)
AST SERPL-CCNC: 34 U/L — HIGH (ref 4–32)
BASOPHILS # BLD AUTO: 0.06 K/UL — SIGNIFICANT CHANGE UP (ref 0–0.2)
BASOPHILS NFR BLD AUTO: 0.8 % — SIGNIFICANT CHANGE UP (ref 0–2)
BILIRUB SERPL-MCNC: 0.5 MG/DL — SIGNIFICANT CHANGE UP (ref 0.2–1.2)
BUN SERPL-MCNC: 11 MG/DL — SIGNIFICANT CHANGE UP (ref 7–23)
CALCIUM SERPL-MCNC: 10.2 MG/DL — SIGNIFICANT CHANGE UP (ref 8.4–10.5)
CHLORIDE SERPL-SCNC: 101 MMOL/L — SIGNIFICANT CHANGE UP (ref 98–107)
CO2 SERPL-SCNC: 25 MMOL/L — SIGNIFICANT CHANGE UP (ref 22–31)
CREAT SERPL-MCNC: 0.57 MG/DL — SIGNIFICANT CHANGE UP (ref 0.5–1.3)
EGFR: 90 ML/MIN/1.73M2 — SIGNIFICANT CHANGE UP
EOSINOPHIL # BLD AUTO: 0.11 K/UL — SIGNIFICANT CHANGE UP (ref 0–0.5)
EOSINOPHIL NFR BLD AUTO: 1.4 % — SIGNIFICANT CHANGE UP (ref 0–6)
GLUCOSE SERPL-MCNC: 93 MG/DL — SIGNIFICANT CHANGE UP (ref 70–99)
HCT VFR BLD CALC: 40.9 % — SIGNIFICANT CHANGE UP (ref 34.5–45)
HGB BLD-MCNC: 12.8 G/DL — SIGNIFICANT CHANGE UP (ref 11.5–15.5)
IANC: 4.46 K/UL — SIGNIFICANT CHANGE UP (ref 1.8–7.4)
IMM GRANULOCYTES NFR BLD AUTO: 0.4 % — SIGNIFICANT CHANGE UP (ref 0–0.9)
LYMPHOCYTES # BLD AUTO: 2.48 K/UL — SIGNIFICANT CHANGE UP (ref 1–3.3)
LYMPHOCYTES # BLD AUTO: 31.1 % — SIGNIFICANT CHANGE UP (ref 13–44)
MAGNESIUM SERPL-MCNC: 2 MG/DL — SIGNIFICANT CHANGE UP (ref 1.6–2.6)
MCHC RBC-ENTMCNC: 27 PG — SIGNIFICANT CHANGE UP (ref 27–34)
MCHC RBC-ENTMCNC: 31.3 GM/DL — LOW (ref 32–36)
MCV RBC AUTO: 86.3 FL — SIGNIFICANT CHANGE UP (ref 80–100)
MONOCYTES # BLD AUTO: 0.84 K/UL — SIGNIFICANT CHANGE UP (ref 0–0.9)
MONOCYTES NFR BLD AUTO: 10.5 % — SIGNIFICANT CHANGE UP (ref 2–14)
NEUTROPHILS # BLD AUTO: 4.46 K/UL — SIGNIFICANT CHANGE UP (ref 1.8–7.4)
NEUTROPHILS NFR BLD AUTO: 55.8 % — SIGNIFICANT CHANGE UP (ref 43–77)
NRBC # BLD: 0 /100 WBCS — SIGNIFICANT CHANGE UP (ref 0–0)
NRBC # FLD: 0 K/UL — SIGNIFICANT CHANGE UP (ref 0–0)
NT-PROBNP SERPL-SCNC: 1529 PG/ML — HIGH
PHOSPHATE SERPL-MCNC: 3.5 MG/DL — SIGNIFICANT CHANGE UP (ref 2.5–4.5)
PLATELET # BLD AUTO: 348 K/UL — SIGNIFICANT CHANGE UP (ref 150–400)
POTASSIUM SERPL-MCNC: 4.3 MMOL/L — SIGNIFICANT CHANGE UP (ref 3.5–5.3)
POTASSIUM SERPL-SCNC: 4.3 MMOL/L — SIGNIFICANT CHANGE UP (ref 3.5–5.3)
PROT SERPL-MCNC: 8.6 G/DL — HIGH (ref 6–8.3)
RBC # BLD: 4.74 M/UL — SIGNIFICANT CHANGE UP (ref 3.8–5.2)
RBC # FLD: 14.6 % — HIGH (ref 10.3–14.5)
SODIUM SERPL-SCNC: 142 MMOL/L — SIGNIFICANT CHANGE UP (ref 135–145)
TROPONIN T, HIGH SENSITIVITY RESULT: 15 NG/L — SIGNIFICANT CHANGE UP
TROPONIN T, HIGH SENSITIVITY RESULT: 17 NG/L — SIGNIFICANT CHANGE UP
WBC # BLD: 7.98 K/UL — SIGNIFICANT CHANGE UP (ref 3.8–10.5)
WBC # FLD AUTO: 7.98 K/UL — SIGNIFICANT CHANGE UP (ref 3.8–10.5)

## 2023-10-07 PROCEDURE — 99285 EMERGENCY DEPT VISIT HI MDM: CPT

## 2023-10-07 PROCEDURE — 70498 CT ANGIOGRAPHY NECK: CPT | Mod: 26,MA

## 2023-10-07 PROCEDURE — 70496 CT ANGIOGRAPHY HEAD: CPT | Mod: 26,MA

## 2023-10-07 PROCEDURE — 99350 HOME/RES VST EST HIGH MDM 60: CPT | Mod: 95

## 2023-10-07 PROCEDURE — 70450 CT HEAD/BRAIN W/O DYE: CPT | Mod: 26,MA,59

## 2023-10-07 PROCEDURE — 71045 X-RAY EXAM CHEST 1 VIEW: CPT | Mod: 26

## 2023-10-07 RX ORDER — APIXABAN 2.5 MG/1
5 TABLET, FILM COATED ORAL ONCE
Refills: 0 | Status: COMPLETED | OUTPATIENT
Start: 2023-10-07 | End: 2023-10-07

## 2023-10-07 RX ORDER — CARVEDILOL PHOSPHATE 80 MG/1
12.5 CAPSULE, EXTENDED RELEASE ORAL EVERY 12 HOURS
Refills: 0 | Status: DISCONTINUED | OUTPATIENT
Start: 2023-10-07 | End: 2023-10-08

## 2023-10-07 RX ADMIN — Medication 0.2 MILLIGRAM(S): at 17:48

## 2023-10-07 RX ADMIN — CARVEDILOL PHOSPHATE 12.5 MILLIGRAM(S): 80 CAPSULE, EXTENDED RELEASE ORAL at 23:02

## 2023-10-07 RX ADMIN — APIXABAN 5 MILLIGRAM(S): 2.5 TABLET, FILM COATED ORAL at 23:02

## 2023-10-07 NOTE — ED ADULT NURSE REASSESSMENT NOTE - SYMPTOMS
Pt with hx of cva with rt residual weakness. Pt reports increased weakness since this am. Pt is axox3.. Pt with elevated BP. Clonodine given as per orders. reassessment to follow./weakness

## 2023-10-07 NOTE — ED PROVIDER NOTE - NS_EDPROVIDERDISPOUSERTYPE_ED_A_ED
Attending Attestation (For Attendings USE Only)... return to ED if symptoms worsen, persist or questions arise/need for outpatient follow-up/lab results/radiology results

## 2023-10-07 NOTE — ED PROVIDER NOTE - OBJECTIVE STATEMENT
83-year-old female history of A-fib, CVA with right-sided deficit, HTN, prediabetes, pulmonary hypertension, tricuspid regurg presenting for right sided heaviness.  The patient reports that her symptoms began this morning at 8 AM.  The patient reports that she does have right-sided weakness usually but this time was different and it was in her upper and lower extremities.  The patient is unsure of how long her symptoms lasted for but states that her symptoms had completely resolved.  EMS was called at that time and patient was found to be hypertensive to 220/100.  The patient reports that over the past few days she has also been having shortness of breath.  The patient denies any other symptoms at this time.  The patient reports that she DID take her blood pressure medication this morning.   Patient denies fevers, chills, chest pain, headache, visual changes, nausea, vomiting, burning with urination, abdominal pain.

## 2023-10-07 NOTE — ED PROVIDER NOTE - NSICDXPASTSURGICALHX_GEN_ALL_CORE_FT
PAST SURGICAL HISTORY:  Disorder of thyroid s/p partial thyroidectomy 35 years ago    H/O: hysterectomy 30 years ago

## 2023-10-07 NOTE — ED PROVIDER NOTE - ATTENDING CONTRIBUTION TO CARE
Irena Brody MD attending physician patient is 83-year-old woman with history of A-fib stroke right-sided defect who comes in with hypertension today but because of right-sided heaviness that was worse.  Denies fevers chills chest pain headache visual changes shortness of breath she has difficulty moving the right side but feels it has improved.  Her blood pressure was 222/111    Patient is able to communicate although is somewhat limited.  Significant weakness on the right side but does not have the same sensation as before.  Patient was given extra dose of clonidine 0.2 mg given the high pressure of the pressure did come down to 180/but is returned to 219/106.  Patient with clearly very labile hypertension that needs to be treated appropriately especially in the setting of a TIA    Cor regular rate rhythm S1-S2 lungs are clear, abdomen soft    Patient will need to be admitted to the hospital for exactly that.    I performed a history and physical exam of the patient and discussed their management with the resident and /or advanced care provider. I reviewed the resident and /or ACP's note and agree with the documented findings and plan of care. My medical decison making and observations are found above.

## 2023-10-07 NOTE — ED PROVIDER NOTE - NSICDXFAMILYHX_GEN_ALL_CORE_FT
FAMILY HISTORY:  Father  Still living? Unknown  Family history of atrial fibrillation, Age at diagnosis: Age Unknown  Family history of cerebrovascular accident (CVA), Age at diagnosis: Age Unknown    Mother  Still living? No  Family history of heart attack, Age at diagnosis: Age Unknown    Sibling  Still living? Unknown  Family history of atrial fibrillation, Age at diagnosis: Age Unknown

## 2023-10-07 NOTE — ED PROVIDER NOTE - NSICDXPASTMEDICALHX_GEN_ALL_CORE_FT
PAST MEDICAL HISTORY:  Atrial fibrillation currently on Eliquis since summer 2017    Bradycardia     CVA (cerebral vascular accident) 2008, right side weakness and impaired speech since CVA    Hypertension     Other specified disorders of nose and nasal sinuses     Tricuspid regurgitation

## 2023-10-07 NOTE — ED ADULT TRIAGE NOTE - CHIEF COMPLAINT QUOTE
Pt arrives via EMS from home c/o HTN and increased weakness to right side of body since waking up at 08:00 AM. LKN 21:00 last night. Arrives with L 22g IV to hand. PMHx: CVA (residual right-sided weakness), afib (Eliquis), HTN. MD John contacted for stroke eval.

## 2023-10-07 NOTE — ED PROVIDER NOTE - CLINICAL SUMMARY MEDICAL DECISION MAKING FREE TEXT BOX
83-year-old female history of A-fib, CVA with right-sided deficit, HTN, prediabetes, pulmonary hypertension, tricuspid regurg presenting for right-sided heaviness.  Patient denies fevers, chills, chest pain, headache, visual changes, nausea, vomiting, burning with urination, abdominal pain.  VS. /111.  PE nonactionable.     Differential not limited to ligament hypertension with concerns for intracranial pathology, CVA, TIA, renal dysfunction, pulmonary edema, ACS.  Will obtain basic labs, cardiac labs, CT, CTA head and neck, BNP.  Dispo pending labs imaging and reassessment. 83-year-old female history of A-fib, CVA with right-sided deficit, HTN, prediabetes, pulmonary hypertension, tricuspid regurg presenting for right-sided heaviness.  Patient denies fevers, chills, chest pain, headache, visual changes, nausea, vomiting, burning with urination, abdominal pain.  VS. /111.  PE nonactionable.     Differential not limited to ligament hypertension with concerns for intracranial pathology, CVA, TIA, renal dysfunction, pulmonary edema, ACS.  Will obtain basic labs, cardiac labs, CT, CTA head and neck, BNP.  Dispo pending labs imaging and reassessment.    Irena Brody MD attending physician patient is 83-year-old woman with history of A-fib stroke right-sided defect who comes in with hypertension today but because of right-sided heaviness that was worse.  Denies fevers chills chest pain headache visual changes shortness of breath she has difficulty moving the right side but feels it has improved.  Her blood pressure was 222/111    Patient is able to communicate although is somewhat limited.  Significant weakness on the right side but does not have the same sensation as before.  Patient was given extra dose of clonidine 0.2 mg given the high pressure of the pressure did come down to 180/but is returned to 219/106.  Patient with clearly very labile hypertension that needs to be treated appropriately especially in the setting of a TIA    Cor regular rate rhythm S1-S2 lungs are clear, abdomen soft    Patient will need to be admitted to the hospital for exactly that.

## 2023-10-07 NOTE — ED CLERICAL - NS ED CLERK NOTE PRE-ARRIVAL INFORMATION; ADDITIONAL PRE-ARRIVAL INFORMATION

## 2023-10-07 NOTE — ED PROVIDER NOTE - CARE PLAN
1 Principal Discharge DX:	Brain TIA   Principal Discharge DX:	Brain TIA  Secondary Diagnosis:	Accelerated hypertension

## 2023-10-07 NOTE — ED PROVIDER NOTE - PHYSICAL EXAMINATION
GENERAL: Awake, alert, NAD  HEENT: NC/AT, moist mucous membranes, PERRL, EOMI  LUNGS: CTAB, no wheezes or crackles   CARDIAC: RRR, no m/r/g  ABDOMEN: Soft, non tender, non distended, no rebound, no guarding  BACK: No midline spinal tenderness, no CVA tenderness  EXT: No edema, no calf tenderness, 2+ DP pulses bilaterally, no deformities.  NEURO: A&Ox3. Moving all extremities. Right sided residual deficits. decreased strength in the RUE and RLE. Patient also has a hevay speech that is normal for her.    SKIN: Warm and dry. No rash.  PSYCH: Normal affect.

## 2023-10-08 ENCOUNTER — NON-APPOINTMENT (OUTPATIENT)
Age: 83
End: 2023-10-08

## 2023-10-08 DIAGNOSIS — I48.91 UNSPECIFIED ATRIAL FIBRILLATION: ICD-10-CM

## 2023-10-08 DIAGNOSIS — E07.9 DISORDER OF THYROID, UNSPECIFIED: ICD-10-CM

## 2023-10-08 DIAGNOSIS — Z29.9 ENCOUNTER FOR PROPHYLACTIC MEASURES, UNSPECIFIED: ICD-10-CM

## 2023-10-08 DIAGNOSIS — I10 ESSENTIAL (PRIMARY) HYPERTENSION: ICD-10-CM

## 2023-10-08 DIAGNOSIS — R53.1 WEAKNESS: ICD-10-CM

## 2023-10-08 LAB
A1C WITH ESTIMATED AVERAGE GLUCOSE RESULT: 5.4 % — SIGNIFICANT CHANGE UP (ref 4–5.6)
ANION GAP SERPL CALC-SCNC: 22 MMOL/L — HIGH (ref 7–14)
BUN SERPL-MCNC: 7 MG/DL — SIGNIFICANT CHANGE UP (ref 7–23)
CALCIUM SERPL-MCNC: 10 MG/DL — SIGNIFICANT CHANGE UP (ref 8.4–10.5)
CHLORIDE SERPL-SCNC: 93 MMOL/L — LOW (ref 98–107)
CHOLEST SERPL-MCNC: 208 MG/DL — HIGH
CO2 SERPL-SCNC: 20 MMOL/L — LOW (ref 22–31)
CREAT SERPL-MCNC: 0.54 MG/DL — SIGNIFICANT CHANGE UP (ref 0.5–1.3)
EGFR: 91 ML/MIN/1.73M2 — SIGNIFICANT CHANGE UP
ESTIMATED AVERAGE GLUCOSE: 108 — SIGNIFICANT CHANGE UP
GLUCOSE SERPL-MCNC: 179 MG/DL — HIGH (ref 70–99)
HCT VFR BLD CALC: 44.4 % — SIGNIFICANT CHANGE UP (ref 34.5–45)
HDLC SERPL-MCNC: 71 MG/DL — SIGNIFICANT CHANGE UP
HGB BLD-MCNC: 14.2 G/DL — SIGNIFICANT CHANGE UP (ref 11.5–15.5)
LIPID PNL WITH DIRECT LDL SERPL: 124 MG/DL — HIGH
MAGNESIUM SERPL-MCNC: 1.9 MG/DL — SIGNIFICANT CHANGE UP (ref 1.6–2.6)
MCHC RBC-ENTMCNC: 26.9 PG — LOW (ref 27–34)
MCHC RBC-ENTMCNC: 32 GM/DL — SIGNIFICANT CHANGE UP (ref 32–36)
MCV RBC AUTO: 84.3 FL — SIGNIFICANT CHANGE UP (ref 80–100)
NON HDL CHOLESTEROL: 137 MG/DL — HIGH
NRBC # BLD: 0 /100 WBCS — SIGNIFICANT CHANGE UP (ref 0–0)
NRBC # FLD: 0 K/UL — SIGNIFICANT CHANGE UP (ref 0–0)
PHOSPHATE SERPL-MCNC: 3.3 MG/DL — SIGNIFICANT CHANGE UP (ref 2.5–4.5)
PLATELET # BLD AUTO: 358 K/UL — SIGNIFICANT CHANGE UP (ref 150–400)
POTASSIUM SERPL-MCNC: 3.9 MMOL/L — SIGNIFICANT CHANGE UP (ref 3.5–5.3)
POTASSIUM SERPL-SCNC: 3.9 MMOL/L — SIGNIFICANT CHANGE UP (ref 3.5–5.3)
RBC # BLD: 5.27 M/UL — HIGH (ref 3.8–5.2)
RBC # FLD: 14.4 % — SIGNIFICANT CHANGE UP (ref 10.3–14.5)
SODIUM SERPL-SCNC: 135 MMOL/L — SIGNIFICANT CHANGE UP (ref 135–145)
T4 FREE SERPL-MCNC: 1.5 NG/DL — SIGNIFICANT CHANGE UP (ref 0.9–1.8)
TRIGL SERPL-MCNC: 65 MG/DL — SIGNIFICANT CHANGE UP
TSH SERPL-MCNC: 0.79 UIU/ML — SIGNIFICANT CHANGE UP (ref 0.27–4.2)
WBC # BLD: 7.66 K/UL — SIGNIFICANT CHANGE UP (ref 3.8–10.5)
WBC # FLD AUTO: 7.66 K/UL — SIGNIFICANT CHANGE UP (ref 3.8–10.5)

## 2023-10-08 PROCEDURE — 99222 1ST HOSP IP/OBS MODERATE 55: CPT

## 2023-10-08 PROCEDURE — 99223 1ST HOSP IP/OBS HIGH 75: CPT

## 2023-10-08 RX ORDER — FUROSEMIDE 40 MG
20 TABLET ORAL DAILY
Refills: 0 | Status: DISCONTINUED | OUTPATIENT
Start: 2023-10-08 | End: 2023-10-13

## 2023-10-08 RX ORDER — CARVEDILOL PHOSPHATE 80 MG/1
12.5 CAPSULE, EXTENDED RELEASE ORAL EVERY 12 HOURS
Refills: 0 | Status: DISCONTINUED | OUTPATIENT
Start: 2023-10-08 | End: 2023-10-08

## 2023-10-08 RX ORDER — CARVEDILOL PHOSPHATE 80 MG/1
12.5 CAPSULE, EXTENDED RELEASE ORAL EVERY 12 HOURS
Refills: 0 | Status: DISCONTINUED | OUTPATIENT
Start: 2023-10-08 | End: 2023-10-13

## 2023-10-08 RX ORDER — APIXABAN 2.5 MG/1
5 TABLET, FILM COATED ORAL EVERY 12 HOURS
Refills: 0 | Status: DISCONTINUED | OUTPATIENT
Start: 2023-10-08 | End: 2023-10-13

## 2023-10-08 RX ORDER — ATORVASTATIN CALCIUM 80 MG/1
40 TABLET, FILM COATED ORAL AT BEDTIME
Refills: 0 | Status: DISCONTINUED | OUTPATIENT
Start: 2023-10-08 | End: 2023-10-13

## 2023-10-08 RX ORDER — HYDRALAZINE HCL 50 MG
50 TABLET ORAL THREE TIMES A DAY
Refills: 0 | Status: DISCONTINUED | OUTPATIENT
Start: 2023-10-08 | End: 2023-10-08

## 2023-10-08 RX ORDER — HYDRALAZINE HCL 50 MG
50 TABLET ORAL THREE TIMES A DAY
Refills: 0 | Status: DISCONTINUED | OUTPATIENT
Start: 2023-10-08 | End: 2023-10-13

## 2023-10-08 RX ORDER — ASPIRIN/CALCIUM CARB/MAGNESIUM 324 MG
325 TABLET ORAL DAILY
Refills: 0 | Status: DISCONTINUED | OUTPATIENT
Start: 2023-10-08 | End: 2023-10-08

## 2023-10-08 RX ORDER — HYDRALAZINE HCL 50 MG
1 TABLET ORAL
Refills: 0 | DISCHARGE

## 2023-10-08 RX ORDER — ACETAMINOPHEN 500 MG
650 TABLET ORAL EVERY 6 HOURS
Refills: 0 | Status: DISCONTINUED | OUTPATIENT
Start: 2023-10-08 | End: 2023-10-13

## 2023-10-08 RX ORDER — AMLODIPINE BESYLATE 2.5 MG/1
1 TABLET ORAL
Qty: 0 | Refills: 0 | DISCHARGE

## 2023-10-08 RX ADMIN — Medication 20 MILLIGRAM(S): at 10:04

## 2023-10-08 RX ADMIN — CARVEDILOL PHOSPHATE 12.5 MILLIGRAM(S): 80 CAPSULE, EXTENDED RELEASE ORAL at 09:52

## 2023-10-08 RX ADMIN — ATORVASTATIN CALCIUM 40 MILLIGRAM(S): 80 TABLET, FILM COATED ORAL at 21:32

## 2023-10-08 RX ADMIN — CARVEDILOL PHOSPHATE 12.5 MILLIGRAM(S): 80 CAPSULE, EXTENDED RELEASE ORAL at 17:03

## 2023-10-08 RX ADMIN — Medication 50 MILLIGRAM(S): at 13:26

## 2023-10-08 RX ADMIN — Medication 0.2 MILLIGRAM(S): at 17:03

## 2023-10-08 RX ADMIN — APIXABAN 5 MILLIGRAM(S): 2.5 TABLET, FILM COATED ORAL at 18:19

## 2023-10-08 RX ADMIN — Medication 50 MILLIGRAM(S): at 21:32

## 2023-10-08 NOTE — H&P ADULT - PROBLEM SELECTOR PLAN 3
as above, initially elevated possibly contributing to TIA symptoms, continue home meds as above as above, initially elevated possibly contributing to TIA symptoms, continue home meds as above  -home lasix possibly causing her urinary freq? check UA as well

## 2023-10-08 NOTE — H&P ADULT - NSHPLABSRESULTS_GEN_ALL_CORE
10-08    135  |  93<L>  |  7   ----------------------------<  179<H>  3.9   |  20<L>  |  0.54    Ca    10.0      08 Oct 2023 10:29  Phos  3.3     10-08  Mg     1.90     10-08    TPro  8.6<H>  /  Alb  4.6  /  TBili  0.5  /  DBili  x   /  AST  34<H>  /  ALT  18  /  AlkPhos  97  10-07                            14.2   7.66  )-----------( 358      ( 08 Oct 2023 10:29 )             44.4             LIVER FUNCTIONS - ( 07 Oct 2023 16:45 )  Alb: 4.6 g/dL / Pro: 8.6 g/dL / ALK PHOS: 97 U/L / ALT: 18 U/L / AST: 34 U/L / GGT: x                 Urinalysis Basic - ( 08 Oct 2023 10:29 )    Color: x / Appearance: x / SG: x / pH: x  Gluc: 179 mg/dL / Ketone: x  / Bili: x / Urobili: x   Blood: x / Protein: x / Nitrite: x   Leuk Esterase: x / RBC: x / WBC x   Sq Epi: x / Non Sq Epi: x / Bacteria: x    EKG: atrial fibrillation    CTH: IMPRESSION:    CT HEAD: No acute intracranial bleeding. Chronic large left MCA territory   infarction.    CTA BRAIN: Patent intracranial circulation. No flow-limiting stenosis or   occlusion.    CTA NECK: Patent cervical vasculature. No flow limiting stenosis or   occlusion.    No change in large heterogeneous thyroid mass with intrathoracic   extension into the right upper lobe with compression of the upper lobe   bronchus and associated atelectasis.    CXR:   FINDINGS:  Right superior mediastinal mass/opacity again seen.  The heart is not accurately assessed in this AP projection.  The lungs are clear. There is no pleural effusion.  There is no pneumothorax.  No acute bony abnormality.    IMPRESSION:  Clear lungs. 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

## 2023-10-08 NOTE — CONSULT NOTE ADULT - SUBJECTIVE AND OBJECTIVE BOX
Neurology - Consult Note    -  Spectra: [phone] ([facility]), [phone] ([facility])  -    HPI: Patient TIFFANY LIM is a 83y (1940) wo/man with a PMHx significant for ***      Review of Systems:  INCOMPLETE   CONSTITUTIONAL: No fevers or chills  EYES AND ENT: No visual changes or no throat pain   NECK: No pain or stiffness  RESPIRATORY: No hemoptysis or shortness of breath  CARDIOVASCULAR: No chest pain or palpitations  GASTROINTESTINAL: No melena or hematochezia  GENITOURINARY: No dysuria or hematuria  NEUROLOGICAL: +As stated in HPI above  SKIN: No itching, burning, rashes, or lesions   All other review of systems is negative unless indicated above.    Allergies:  No Known Allergies      PMHx/PSHx/Family Hx: As above, otherwise see below   CVA (cerebral vascular accident)    Hypertension    Atrial fibrillation    Other specified disorders of nose and nasal sinuses    Tricuspid regurgitation    Bradycardia        Social Hx:  No current use of tobacco, alcohol, or illicit drugs  Lives with ***    Medications:  MEDICATIONS  (STANDING):  carvedilol 12.5 milliGRAM(s) Oral every 12 hours    MEDICATIONS  (PRN):      Vitals:  T(C): 36.9 (10-08-23 @ 00:24), Max: 37.1 (10-07-23 @ 14:02)  HR: 70 (10-08-23 @ 00:24) (70 - 83)  BP: 183/100 (10-08-23 @ 00:24) (181/90 - 223/99)  RR: 16 (10-08-23 @ 00:24) (16 - 22)  SpO2: 98% (10-08-23 @ 00:24) (95% - 98%)    Physical Examination: INCOMPLETE  General - NAD  Cardiovascular - Peripheral pulses palpable, no edema  Eyes - Fundoscopy with flat, sharp optic discs and no hemorrhage or exudates; Fundoscopy not well visualized; Fundoscopy not performed due to safety precautions in the setting of infection risk    Neurologic Exam:  Mental status - Awake, Alert, Oriented to person, place, and time. Speech fluent, repetition and naming intact. Follows simple and complex commands. Attention/concentration, recent and remote memory (including registration and recall), and fund of knowledge intact    Cranial nerves - PERRLA, VFF, EOMI, face sensation (V1-V3) intact b/l, facial strength intact without asymmetry b/l, hearing intact b/l, palate with symmetric elevation, trapezius OR sternocleidomastoid 5/5 strength b/l, tongue midline on protrusion with full lateral movement    Motor - Normal bulk and tone throughout. No pronator drift.  Strength testing            Deltoid      Biceps      Triceps     Wrist Extension    Wrist Flexion     Interossei         R            5                 5               5                     5                              5                        5                 5  L             5                 5               5                     5                              5                        5                 5              Hip Flexion    Hip Extension    Knee Flexion    Knee Extension    Dorsiflexion    Plantar Flexion  R              5                           5                       5                           5                            5                          5  L              5                           5                        5                           5                            5                          5    Sensation - Light touch/temperature OR pain/vibration intact throughout    DTR's -             Biceps      Triceps     Brachioradialis      Patellar    Ankle    Toes/plantar response  R             2+             2+                  2+                       2+            2+                 Down  L              2+             2+                 2+                        2+           2+                 Down    Coordination - Finger to Nose intact b/l. No tremors appreciated    Gait and station - Normal casual gait. Romberg (-)    Labs:                        12.8   7.98  )-----------( 348      ( 07 Oct 2023 16:45 )             40.9     10-07    142  |  101  |  11  ----------------------------<  93  4.3   |  25  |  0.57    Ca    10.2      07 Oct 2023 16:45  Phos  3.5     10-07  Mg     2.00     10-07    TPro  8.6<H>  /  Alb  4.6  /  TBili  0.5  /  DBili  x   /  AST  34<H>  /  ALT  18  /  AlkPhos  97  10-07    CAPILLARY BLOOD GLUCOSE        LIVER FUNCTIONS - ( 07 Oct 2023 16:45 )  Alb: 4.6 g/dL / Pro: 8.6 g/dL / ALK PHOS: 97 U/L / ALT: 18 U/L / AST: 34 U/L / GGT: x               CSF:                  Radiology:  CT HEAD: No acute intracranial bleeding. Chronic large left MCA territory   infarction.    CTA BRAIN: Patent intracranial circulation. No flow-limiting stenosis or   occlusion.    CTA NECK: Patent cervical vasculature. No flow limiting stenosis or   occlusion.    No change in large heterogeneous thyroid mass with intrathoracic   extension into the right upper lobe with compression of the upper lobe   bronchus and associated atelectasis. Neurology - Consult Note    -  Spectra: [phone] ([facility]), [phone] ([facility])  -    HPI: Patient TIFFANY LIM is a 83y (1940) RIGHT handed woman with a PMHx significant for left MCA stroke in 2006 with residual R sided hemiparesis and dysarthria/aphasia, Afib (on apixaban), HTN, who presents to [facility] ED on 10/7/2023, with c/o HTN and R-sided heaviness in her RUE/RLE. Reports this began at 9 AM today (although told ED it was 8 AM and also 21:00 on 10/6). BP reported 223/99 when she presented and 220/100 in the field. Patient also c/o SOB. Per sister, who is bedside - patient has been having trouble controlling her BP recently and PCP has been titrating medications.     Says she felt her R leg and R arm get heavy this AM. Says it did not feel like her previous stroke (which she presented with complete R sided hemiparesis and severe aphasia). This lasted several hours (most of the AM). Struggling to move with EMS per sister. There were no changes in speech, no sensory issues, no vision changes.    Uses a cane to walk. Cannot climb stairs. Does not drive. Has a 24 hour aide at home. Does not manage household chores but can toilet herself. No history of MI. Is reportedly compliant with her apixaban. Cannot use her R hand anymore - has switched to left hand but was originally R handed.    Denies CP. No sick contacts, not sick recently. Normal BM/urinating OK. In her normal state of health prior to this. Taking her apixaban as prescribed per sister.    Review of Systems:  All other review of systems is negative unless indicated above.    Allergies:  No Known Allergies      PMHx/PSHx/Family Hx: As above, otherwise see below   CVA (cerebral vascular accident)    Hypertension    Atrial fibrillation    Other specified disorders of nose and nasal sinuses    Tricuspid regurgitation    Bradycardia        Social Hx:  No current use of tobacco, alcohol, or illicit drugs  Lives with 24 hour aide (has 2 who rotate).    Medications:  MEDICATIONS  (STANDING):  carvedilol 12.5 milliGRAM(s) Oral every 12 hours    MEDICATIONS  (PRN):      Vitals:  T(C): 36.9 (10-08-23 @ 00:24), Max: 37.1 (10-07-23 @ 14:02)  HR: 70 (10-08-23 @ 00:24) (70 - 83)  BP: 183/100 (10-08-23 @ 00:24) (181/90 - 223/99)  RR: 16 (10-08-23 @ 00:24) (16 - 22)  SpO2: 98% (10-08-23 @ 00:24) (95% - 98%)    Physical Examination:  General - NAD, lying supine in bed  Cardiovascular - No LE edema  Eyes - Fundoscopy not performed due to safety precautions in the setting of infection risk, wearing eyeglasses    Neurologic Exam:  Mental status - Awake, Alert, Oriented to person, place, and time. Moderately dysarthric (can be understood). Moderate aphasia. Repetition and naming intact. Follows simple and complex commands. Attention/concentration, recent and remote memory (including registration - although took 2 attempts for 3 words and recall), and fund of knowledge intact    Cranial nerves - PERRLA, VFF, EOMI, face sensation (V1-V3) intact b/l, facial strength intact without asymmetry b/l, hearing intact b/l, palate with symmetric elevation, trapezius 5/5 strength LEFT side, weaker on right, tongue midline on protrusion with full lateral movement    Motor - Atrophy R leg vs L leg, cannot assess pronator drift on R side (none on left)  Strength testing  No drift LUE, Drift RUE  RLE is barely antigravity, RLE KF 3, KE 3  DF 5/5 L, 4/5 R  PF 5/5 L, 4/5 L    Sensation - Light touch/vibration reduced R side    DTR's - Uppers Left 2+, R side unable to assess d/t positioning, Ankle 2+ R, 0 L. Patellars 0 b/l. Equivocal plantar response b/l.    Coordination - Finger to Nose intact left side, Heel to [place] intact left side. Cannot perform R side d/t weakness.    Gait and station - Fall risk, LCAU    Labs:                        12.8   7.98  )-----------( 348      ( 07 Oct 2023 16:45 )             40.9     10-07    142  |  101  |  11  ----------------------------<  93  4.3   |  25  |  0.57    Ca    10.2      07 Oct 2023 16:45  Phos  3.5     10-07  Mg     2.00     10-07    TPro  8.6<H>  /  Alb  4.6  /  TBili  0.5  /  DBili  x   /  AST  34<H>  /  ALT  18  /  AlkPhos  97  10-07    CAPILLARY BLOOD GLUCOSE        LIVER FUNCTIONS - ( 07 Oct 2023 16:45 )  Alb: 4.6 g/dL / Pro: 8.6 g/dL / ALK PHOS: 97 U/L / ALT: 18 U/L / AST: 34 U/L / GGT: x               CSF:                  Radiology:  CT HEAD: No acute intracranial bleeding. Chronic large left MCA territory   infarction.    CTA BRAIN: Patent intracranial circulation. No flow-limiting stenosis or   occlusion.    CTA NECK: Patent cervical vasculature. No flow limiting stenosis or   occlusion.    No change in large heterogeneous thyroid mass with intrathoracic   extension into the right upper lobe with compression of the upper lobe   bronchus and associated atelectasis.

## 2023-10-08 NOTE — CONSULT NOTE ADULT - ATTENDING COMMENTS
Per patient and sister,  R HP and aphasia are back to baseline.    Exam:  R facial weakness.  Aphasia as above.  Dysarthria  Right side motor:  Deltoid 2  Biceps 4+  Triceps 4-  FF 4+  FE 2    HF 2  KE 4+  DF 4-  PF 4    A1C with Estimated Average Glucose Result: 5.4Cholesterol: 208 mg/dL  Triglycerides, Serum: 65 mg/dL  HDL Cholesterol: 71 mg/dL  Non HDL Cholesterol: 137  LDL Cholesterol Calculated: 124 mg/dL (10.08.23 @ 10:29)     A/P  Ms. Reddy is an 84 yo woman with h/o stroke with aphasia and R HP with transient worsening symptoms.  The differential diagnosis includes: stroke, TIA, recrudescence of old deficits.   I agree with work up and management as above.   Thank you.

## 2023-10-08 NOTE — CONSULT NOTE ADULT - ASSESSMENT
Vitals notable for: /99. Labs notable for: ***. *** imaging disclosed: ***. Exam notable for ***.     NIHSS: ***  ABCD2 score: ***  LKN: ***  pre-MRS: ***    Impression: Presenting with worsening of residual R sided deficits i/s/o uncontrolled HTN - c/f (high risk) TIA vs recrudescence of old stroke    Recommendations INCOMPLETE:  [] Admit to ***  [] Telemetry monitoring  [] Dysphagia screen  [] SBP goal <***  [] Aspirin 81mg ***  [] Atorvastatin 40mg (titrate LDL<70)  [] A1c, lipid panel  [] MRI brain w/o contrast  [] TTE with bubble study  [] Neurochecks, vitals q*h  [] Fall and aspiration precautions  [] PT/OT/SLP/CM  [] Diet: ***  [] DVT prophylaxis: enoxaparin 40mg q24h ***    NO tenecteplase d/t ***.  NO thrombectomy d/t ***.    Patient/plan d/w Stroke fellow,  ***, under the supervision of attending vascular neurologist  ***. To be seen by attending in the AM with attestation to follow. TIFFANY LIM is a 83y (1940) RIGHT handed woman with a PMHx significant for left MCA stroke in 2006 with residual R sided hemiparesis and dysarthria/aphasia, Afib (on apixaban), HTN, who presents to [facility] ED on 10/7/2023, with c/o HTN and R-sided heaviness in her RUE/RLE. Reports this began at 9 AM today (although told ED it was 8 AM and also 21:00 on 10/6). BP reported 223/99 when she presented and 220/100 in the field. Patient also c/o SOB. Per sister, who is bedside - patient has been having trouble controlling her BP recently and PCP has been titrating medications. Says she felt her R leg and R arm get heavy this AM. Says it did not feel like her previous stroke (which she presented with complete R sided hemiparesis and severe aphasia). This lasted several hours (most of the AM). Struggling to move with EMS per sister. There were no changes in speech, no sensory issues, no vision changes. Uses a cane to walk. Cannot climb stairs. Does not drive. Has a 24 hour aide at home. Does not manage household chores but can toilet herself. No history of MI. Is reportedly compliant with her apixaban. Cannot use her R hand anymore - has switched to left hand but was originally R handed. Denies CP. No sick contacts, not sick recently. Normal BM/urinating OK. In her normal state of health prior to this. Vitals notable for: /99. Labs notable for: /99. CT imaging disclosed: known prior infarct. Exam notable for chronic R sided weakness and sensory deficit, aphasia/dysarthria.    NIHSS: 5 (dysarthria, aphasia, drift RUE, antigravity RLE)  ABCD2 score: 6  LKN: Varies per history but per most recent report - 10/7/2023, 9:00 AM  pre-MRS: 4    Impression: Presenting with worsening of residual R sided deficits i/s/o uncontrolled HTN - c/f (high risk) TIA vs recrudescence of old stroke i/s/o uncontrolled HTN    Recommendations:  [] Admit to Medicine  [] Telemetry monitoring  [] Dysphagia screen  [] SBP goal <180  [] Continue home apixaban 5mg BID  [] Atorvastatin 40mg (titrate LDL<70)  [] A1c, lipid panel, INR (should be abnormal if patient is taking apixaban)  [] MRI brain w/o contrast  [] Does not need TTE for stroke workup (has known Afib)  [] Neurochecks, vitals per unit routine  [] Fall and aspiration precautions  [] PT/OT/SLP/CM  [] Diet: DASH/TLC  [] DVT prophylaxis: per primary    NO tenecteplase d/t no new neurologic findings (symptoms resolved).  NO thrombectomy d/t no LVO.    To be seen by attending in the AM with attestation to follow. TIFFANY LIM is a 83y (1940) RIGHT handed woman with a PMHx significant for left MCA stroke in 2006 with residual R sided hemiparesis and dysarthria/aphasia, Afib (on apixaban), HTN, who presents to [facility] ED on 10/7/2023, with c/o HTN and R-sided heaviness in her RUE/RLE. Reports this began at 9 AM today (although told ED it was 8 AM and also 21:00 on 10/6). BP reported 223/99 when she presented and 220/100 in the field. Patient also c/o SOB. Per sister, who is bedside - patient has been having trouble controlling her BP recently and PCP has been titrating medications. Says she felt her R leg and R arm get heavy this AM. Says it did not feel like her previous stroke (which she presented with complete R sided hemiparesis and severe aphasia). This lasted several hours (most of the AM). Struggling to move with EMS per sister. There were no changes in speech, no sensory issues, no vision changes. Uses a cane to walk. Cannot climb stairs. Does not drive. Has a 24 hour aide at home. Does not manage household chores but can toilet herself. No history of MI. Is reportedly compliant with her apixaban. Cannot use her R hand anymore - has switched to left hand but was originally R handed. Denies CP. No sick contacts, not sick recently. Normal BM/urinating OK. In her normal state of health prior to this. Vitals notable for: /99. Labs notable for: /99. CT imaging disclosed: known prior infarct. Exam notable for chronic R sided weakness and sensory deficit, aphasia/dysarthria. Patient back to her baseline exam per her report.    NIHSS: 5 (dysarthria, aphasia, drift RUE, antigravity RLE)  ABCD2 score: 6  LKN: Varies per history but per most recent report - 10/7/2023, 9:00 AM  pre-MRS: 4    Impression: Presenting with worsening of residual R sided deficits i/s/o uncontrolled HTN - per patient, she is now back to her baseline - c/f (high risk) TIA vs recrudescence of old stroke i/s/o uncontrolled HTN    Recommendations:  [] Admit to Medicine  [] Telemetry monitoring  [] Dysphagia screen  [] SBP goal <180  [] Continue home apixaban 5mg BID  [] Atorvastatin 40mg (titrate LDL<70)  [] A1c, lipid panel, INR (should be abnormal if patient is taking apixaban)  [] MRI brain w/o contrast  [] Does not need TTE for stroke workup (has known Afib)  [] Neurochecks, vitals per unit routine  [] Fall and aspiration precautions  [] PT/OT/SLP/CM  [] Diet: DASH/TLC  [] DVT prophylaxis: per primary    NO tenecteplase d/t no new neurologic findings (symptoms resolved).  NO thrombectomy d/t no LVO.    To be seen by attending in the AM with attestation to follow.

## 2023-10-08 NOTE — H&P ADULT - PROBLEM SELECTOR PLAN 2
thyroid mass seen on CTs which extend into the R upper lone and has compression on upper lob bronchus  -pt reports she had thyroid surgery 20 years ago for a mass but that it was not malignant  -pt on room air currently and denies sob or chest pain.  -will obtain dedicated CT chest/abd/pelvis to further evaluate and do MR brain w/w/o contrast as above  -likely pulm consult after CTs done and onc consult in AM

## 2023-10-08 NOTE — H&P ADULT - HISTORY OF PRESENT ILLNESS
Pt is an 82 y/o woman w/ afib on eliquis, CVA w/ R side deficit, HTN, prediabetes, pulm HTN, tricuspid regurg and hx of ?thyroid surgery who presents to the ER with 1 day of "R sided heaviness" worse than usual. She also had some sob as well and has been dealing with elevated blood pressures to the 200s systolic. She denies any chest pain, headache, blurry vision, fever, cough, abd pain, dysuria or diarrhea. Her neuro symptoms reportedly resolved over time. Unclear of new meds. Pt can eat normal food at home. Reportedly has been  urinating alot? but no pain. In the ER pt was given home meds including eliquis.

## 2023-10-08 NOTE — H&P ADULT - NSHPPHYSICALEXAM_GEN_ALL_CORE
PHYSICAL EXAM:  GENERAL: NAD, laying in bed  HEAD:  Atraumatic, Normocephalic  EYES: EOMI, PERRLA, conjunctiva and sclera clear  NECK: Supple, No JVD  CHEST/LUNG: Clear to auscultation bilaterally; No obvious rales or wheeze  HEART: irregular rate; No murmurs, rubs, or gallops, (+)S1, S2  ABDOMEN: Soft, Nontender, Nondistended; Normal Bowel sounds   EXTREMITIES:  2+ Peripheral Pulses, No clubbing, cyanosis, or edema  PSYCH: normal mood and affect  NEUROLOGY: AAOx3, +R facial droop, +R side strength 3-4/5  SKIN: No rashes or lesions

## 2023-10-08 NOTE — H&P ADULT - PROBLEM SELECTOR PLAN 1
Pt hx of cva and has baseline R side deficits, but worsened yday, now resolved. Also had elevated BPs during this time which likely contributed.   -Neurology consulted, NIHSS 5  -continue home bp meds, now improving. hydral 50mg tid, coreg 12.5mg bid, lasix 20mg daily, clonidine 0.2mg bid  -continue eliquis  -obtain MR brain   -PT/SLP

## 2023-10-08 NOTE — H&P ADULT - ASSESSMENT
82 y/o woman w/ afib on eliquis, CVA w/ R side deficit, HTN, prediabetes, pulm HTN, tricuspid regurg and hx of ?thyroid surgery who presents to the ER with 1 day of "R sided heaviness" worse than usual. Also found to have ?thyroid mass extending into R lung. Admitted for further eval

## 2023-10-08 NOTE — H&P ADULT - NSHPREVIEWOFSYSTEMS_GEN_ALL_CORE
REVIEW OF SYSTEMS:    CONSTITUTIONAL: +R side weakness, no fevers or chills  EYES/ENT: No visual changes;  No dysphagia  NECK: No pain or stiffness  RESPIRATORY: +mild sob, No cough, wheezing, nor hemoptysis;  CARDIOVASCULAR: No chest pain or palpitations; No lower extremity edema  GASTROINTESTINAL: No abdominal or epigastric pain. No nausea, vomiting, or hematemesis; No diarrhea or constipation. No melena or hematochezia.  GENITOURINARY: +urinary freq. no dysuria, or hematuria  NEUROLOGICAL: +R side deficit  SKIN: No itching, burning, rashes, or lesions
H

## 2023-10-09 ENCOUNTER — NON-APPOINTMENT (OUTPATIENT)
Age: 83
End: 2023-10-09

## 2023-10-09 PROBLEM — R47.1 DYSARTHRIA ON EXAMINATION: Status: ACTIVE | Noted: 2023-10-09

## 2023-10-09 LAB
ANION GAP SERPL CALC-SCNC: 10 MMOL/L — SIGNIFICANT CHANGE UP (ref 7–14)
BUN SERPL-MCNC: 13 MG/DL — SIGNIFICANT CHANGE UP (ref 7–23)
CALCIUM SERPL-MCNC: 9.2 MG/DL — SIGNIFICANT CHANGE UP (ref 8.4–10.5)
CHLORIDE SERPL-SCNC: 95 MMOL/L — LOW (ref 98–107)
CO2 SERPL-SCNC: 27 MMOL/L — SIGNIFICANT CHANGE UP (ref 22–31)
CREAT SERPL-MCNC: 0.95 MG/DL — SIGNIFICANT CHANGE UP (ref 0.5–1.3)
EGFR: 59 ML/MIN/1.73M2 — LOW
GLUCOSE SERPL-MCNC: 96 MG/DL — SIGNIFICANT CHANGE UP (ref 70–99)
HCT VFR BLD CALC: 40.8 % — SIGNIFICANT CHANGE UP (ref 34.5–45)
HGB BLD-MCNC: 12.9 G/DL — SIGNIFICANT CHANGE UP (ref 11.5–15.5)
MAGNESIUM SERPL-MCNC: 1.8 MG/DL — SIGNIFICANT CHANGE UP (ref 1.6–2.6)
MCHC RBC-ENTMCNC: 26.9 PG — LOW (ref 27–34)
MCHC RBC-ENTMCNC: 31.6 GM/DL — LOW (ref 32–36)
MCV RBC AUTO: 85.2 FL — SIGNIFICANT CHANGE UP (ref 80–100)
NRBC # BLD: 0 /100 WBCS — SIGNIFICANT CHANGE UP (ref 0–0)
NRBC # FLD: 0 K/UL — SIGNIFICANT CHANGE UP (ref 0–0)
PHOSPHATE SERPL-MCNC: 4 MG/DL — SIGNIFICANT CHANGE UP (ref 2.5–4.5)
PLATELET # BLD AUTO: 320 K/UL — SIGNIFICANT CHANGE UP (ref 150–400)
POTASSIUM SERPL-MCNC: 3.7 MMOL/L — SIGNIFICANT CHANGE UP (ref 3.5–5.3)
POTASSIUM SERPL-SCNC: 3.7 MMOL/L — SIGNIFICANT CHANGE UP (ref 3.5–5.3)
RBC # BLD: 4.79 M/UL — SIGNIFICANT CHANGE UP (ref 3.8–5.2)
RBC # FLD: 14.6 % — HIGH (ref 10.3–14.5)
SODIUM SERPL-SCNC: 132 MMOL/L — LOW (ref 135–145)
WBC # BLD: 7.15 K/UL — SIGNIFICANT CHANGE UP (ref 3.8–10.5)
WBC # FLD AUTO: 7.15 K/UL — SIGNIFICANT CHANGE UP (ref 3.8–10.5)

## 2023-10-09 PROCEDURE — 74177 CT ABD & PELVIS W/CONTRAST: CPT | Mod: 26

## 2023-10-09 PROCEDURE — 71260 CT THORAX DX C+: CPT | Mod: 26

## 2023-10-09 PROCEDURE — 99233 SBSQ HOSP IP/OBS HIGH 50: CPT

## 2023-10-09 RX ORDER — POTASSIUM CHLORIDE 20 MEQ
20 PACKET (EA) ORAL ONCE
Refills: 0 | Status: DISCONTINUED | OUTPATIENT
Start: 2023-10-09 | End: 2023-10-11

## 2023-10-09 RX ORDER — MAGNESIUM SULFATE 500 MG/ML
1 VIAL (ML) INJECTION ONCE
Refills: 0 | Status: COMPLETED | OUTPATIENT
Start: 2023-10-09 | End: 2023-10-09

## 2023-10-09 RX ADMIN — Medication 0.2 MILLIGRAM(S): at 18:02

## 2023-10-09 RX ADMIN — Medication 50 MILLIGRAM(S): at 22:34

## 2023-10-09 RX ADMIN — APIXABAN 5 MILLIGRAM(S): 2.5 TABLET, FILM COATED ORAL at 22:34

## 2023-10-09 RX ADMIN — CARVEDILOL PHOSPHATE 12.5 MILLIGRAM(S): 80 CAPSULE, EXTENDED RELEASE ORAL at 05:17

## 2023-10-09 RX ADMIN — CARVEDILOL PHOSPHATE 12.5 MILLIGRAM(S): 80 CAPSULE, EXTENDED RELEASE ORAL at 18:02

## 2023-10-09 RX ADMIN — Medication 100 GRAM(S): at 16:53

## 2023-10-09 RX ADMIN — Medication 20 MILLIGRAM(S): at 05:17

## 2023-10-09 RX ADMIN — Medication 50 MILLIGRAM(S): at 15:49

## 2023-10-09 RX ADMIN — Medication 0.2 MILLIGRAM(S): at 05:16

## 2023-10-09 RX ADMIN — ATORVASTATIN CALCIUM 40 MILLIGRAM(S): 80 TABLET, FILM COATED ORAL at 22:34

## 2023-10-09 RX ADMIN — Medication 50 MILLIGRAM(S): at 05:17

## 2023-10-09 RX ADMIN — APIXABAN 5 MILLIGRAM(S): 2.5 TABLET, FILM COATED ORAL at 05:17

## 2023-10-09 NOTE — PROGRESS NOTE ADULT - ASSESSMENT
83F Thyroid mass partial resection, Afib - Eliquis, CVA w/ Rt hemiparesis, HTN, TR w/ PulmHTN, p/w worsening Rt hemiparesis c/b HTN emergency, enlarging thyroid mass - suspect goiter.

## 2023-10-09 NOTE — PHYSICAL THERAPY INITIAL EVALUATION ADULT - PERTINENT HX OF CURRENT PROBLEM, REHAB EVAL
Patient presented with right sided "heaviness" as well as being found to have a thyroid mass extending into her upper lobe of her lung

## 2023-10-09 NOTE — PATIENT PROFILE ADULT - FALL HARM RISK - HARM RISK INTERVENTIONS

## 2023-10-09 NOTE — PROGRESS NOTE ADULT - SUBJECTIVE AND OBJECTIVE BOX
[facility] Division of Hospital Medicine  Joseph Prieto MD  Pager (M-F, 8A-5P): [phone]  Other Times:  [phone]    Patient is a 83y old  Female who presents with a chief complaint of TIA (08 Oct 2023 11:12)    SUBJECTIVE / OVERNIGHT EVENTS:  Patient states that her right sided weakness is improved. No F/C, N/V, CP, SOB, Cough, lightheadedness, dizziness, abdominal pain, diarrhea, dysuria.    MEDICATIONS  (STANDING):  apixaban 5 milliGRAM(s) Oral every 12 hours  atorvastatin 40 milliGRAM(s) Oral at bedtime  carvedilol 12.5 milliGRAM(s) Oral every 12 hours  cloNIDine 0.2 milliGRAM(s) Oral two times a day  furosemide    Tablet 20 milliGRAM(s) Oral daily  hydrALAZINE 50 milliGRAM(s) Oral three times a day    MEDICATIONS  (PRN):  acetaminophen     Tablet .. 650 milliGRAM(s) Oral every 6 hours PRN Temp greater or equal to 38C (100.4F), Mild Pain (1 - 3)      Vital Signs Last 24 Hrs  T(C): 36.7 (09 Oct 2023 10:07), Max: 37 (08 Oct 2023 21:22)  T(F): 98.1 (09 Oct 2023 10:07), Max: 98.6 (08 Oct 2023 21:22)  HR: 74 (09 Oct 2023 10:07) (69 - 90)  BP: 136/74 (09 Oct 2023 10:07) (93/46 - 178/98)  BP(mean): 61 (08 Oct 2023 18:18) (61 - 61)  RR: 16 (09 Oct 2023 10:07) (16 - 18)  SpO2: 98% (09 Oct 2023 10:07) (97% - 98%)    Parameters below as of 09 Oct 2023 10:07  Patient On (Oxygen Delivery Method): room air      CAPILLARY BLOOD GLUCOSE        I&O's Summary      PHYSICAL EXAM:  CONSTITUTIONAL: NAD  EYES: PERRLA; conjunctiva and sclera clear  ENMT: Moist oral mucosa, no pharyngeal injection or exudates; normal dentition  NECK: Supple, goiter noted  RESPIRATORY: Normal respiratory effort; lungs are clear to auscultation bilaterally  CARDIOVASCULAR: Regular rate and rhythm, normal S1 and S2, no murmur/rub/gallop; No lower extremity edema; Peripheral pulses are 2+ bilaterally  ABDOMEN: Nontender to palpation, normoactive bowel sounds, no rebound/guarding; No hepatosplenomegaly  MUSCULOSKELETAL:  Did not assess gait; no clubbing or cyanosis of digits; no joint swelling or tenderness to palpation  PSYCH: A+O to person, place, and time; affect appropriate  NEUROLOGY: CN 2-12 are intact and symmetric; no gross sensory deficits; Rt hemiparesis  SKIN: No rashes; no palpable lesions    LABS:                        12.9   7.15  )-----------( 320      ( 09 Oct 2023 06:35 )             40.8     10-09    132<L>  |  95<L>  |  13  ----------------------------<  96  3.7   |  27  |  0.95    Ca    9.2      09 Oct 2023 06:35  Phos  4.0     10-09  Mg     1.80     10-09    TPro  8.6<H>  /  Alb  4.6  /  TBili  0.5  /  DBili  x   /  AST  34<H>  /  ALT  18  /  AlkPhos  97  10-07          Urinalysis Basic - ( 09 Oct 2023 06:35 )    Color: x / Appearance: x / SG: x / pH: x  Gluc: 96 mg/dL / Ketone: x  / Bili: x / Urobili: x   Blood: x / Protein: x / Nitrite: x   Leuk Esterase: x / RBC: x / WBC x   Sq Epi: x / Non Sq Epi: x / Bacteria: x        RADIOLOGY & ADDITIONAL TESTS:    Imaging Personally Reviewed:    Care Discussed with Consultants/Other Providers:

## 2023-10-10 LAB
ANION GAP SERPL CALC-SCNC: 14 MMOL/L — SIGNIFICANT CHANGE UP (ref 7–14)
BUN SERPL-MCNC: 14 MG/DL — SIGNIFICANT CHANGE UP (ref 7–23)
CALCIUM SERPL-MCNC: 9.6 MG/DL — SIGNIFICANT CHANGE UP (ref 8.4–10.5)
CHLORIDE SERPL-SCNC: 98 MMOL/L — SIGNIFICANT CHANGE UP (ref 98–107)
CO2 SERPL-SCNC: 23 MMOL/L — SIGNIFICANT CHANGE UP (ref 22–31)
CREAT SERPL-MCNC: 0.81 MG/DL — SIGNIFICANT CHANGE UP (ref 0.5–1.3)
EGFR: 72 ML/MIN/1.73M2 — SIGNIFICANT CHANGE UP
GLUCOSE SERPL-MCNC: 91 MG/DL — SIGNIFICANT CHANGE UP (ref 70–99)
HCT VFR BLD CALC: 43.4 % — SIGNIFICANT CHANGE UP (ref 34.5–45)
HGB BLD-MCNC: 13.6 G/DL — SIGNIFICANT CHANGE UP (ref 11.5–15.5)
MAGNESIUM SERPL-MCNC: 2.2 MG/DL — SIGNIFICANT CHANGE UP (ref 1.6–2.6)
MCHC RBC-ENTMCNC: 26.7 PG — LOW (ref 27–34)
MCHC RBC-ENTMCNC: 31.3 GM/DL — LOW (ref 32–36)
MCV RBC AUTO: 85.1 FL — SIGNIFICANT CHANGE UP (ref 80–100)
NRBC # BLD: 0 /100 WBCS — SIGNIFICANT CHANGE UP (ref 0–0)
NRBC # FLD: 0 K/UL — SIGNIFICANT CHANGE UP (ref 0–0)
PHOSPHATE SERPL-MCNC: 4.3 MG/DL — SIGNIFICANT CHANGE UP (ref 2.5–4.5)
PLATELET # BLD AUTO: 339 K/UL — SIGNIFICANT CHANGE UP (ref 150–400)
POTASSIUM SERPL-MCNC: 3.9 MMOL/L — SIGNIFICANT CHANGE UP (ref 3.5–5.3)
POTASSIUM SERPL-SCNC: 3.9 MMOL/L — SIGNIFICANT CHANGE UP (ref 3.5–5.3)
RBC # BLD: 5.1 M/UL — SIGNIFICANT CHANGE UP (ref 3.8–5.2)
RBC # FLD: 14.5 % — SIGNIFICANT CHANGE UP (ref 10.3–14.5)
SODIUM SERPL-SCNC: 135 MMOL/L — SIGNIFICANT CHANGE UP (ref 135–145)
WBC # BLD: 7.94 K/UL — SIGNIFICANT CHANGE UP (ref 3.8–10.5)
WBC # FLD AUTO: 7.94 K/UL — SIGNIFICANT CHANGE UP (ref 3.8–10.5)

## 2023-10-10 PROCEDURE — 99232 SBSQ HOSP IP/OBS MODERATE 35: CPT

## 2023-10-10 RX ADMIN — Medication 0.2 MILLIGRAM(S): at 17:07

## 2023-10-10 RX ADMIN — Medication 0.2 MILLIGRAM(S): at 05:46

## 2023-10-10 RX ADMIN — Medication 50 MILLIGRAM(S): at 05:45

## 2023-10-10 RX ADMIN — CARVEDILOL PHOSPHATE 12.5 MILLIGRAM(S): 80 CAPSULE, EXTENDED RELEASE ORAL at 17:07

## 2023-10-10 RX ADMIN — Medication 50 MILLIGRAM(S): at 13:35

## 2023-10-10 RX ADMIN — Medication 20 MILLIGRAM(S): at 05:45

## 2023-10-10 RX ADMIN — APIXABAN 5 MILLIGRAM(S): 2.5 TABLET, FILM COATED ORAL at 09:52

## 2023-10-10 RX ADMIN — CARVEDILOL PHOSPHATE 12.5 MILLIGRAM(S): 80 CAPSULE, EXTENDED RELEASE ORAL at 05:46

## 2023-10-10 NOTE — SWALLOW BEDSIDE ASSESSMENT ADULT - SWALLOW EVAL: DIAGNOSIS
1. Functional oral phase for pureed, regular solids, mildly thick, and thin liquids marked by adequate oral acceptance, slower mastication with solids; however overall functional and timely collection and transport. Adequate clearance post swallow. 2. Judged functional pharyngeal phase for all PO consistencies provided marked by suspected timely pharyngeal swallow trigger and hyolaryngeal elevation noted by digital palpation without evidence of airway penetration/aspiration. 3. Recommend regular solids and thin liquids with aspiration precautions. Slow pacing, single/small bites/sips, and alternate solids with liquids. Continue to monitor and notify SLP if changes occur. 1. Functional oral phase for pureed, regular solids, mildly thick, and thin liquids marked by adequate oral acceptance, slower mastication with solids; however overall functional and timely collection and transport. Adequate clearance post swallow. 2. Judged functional pharyngeal phase for all PO consistencies provided marked by suspected timely pharyngeal swallow trigger and hyolaryngeal elevation noted by digital palpation without evidence of airway penetration/aspiration. 3. Recommend regular solids and thin liquids with aspiration precautions. Slow pacing, single/small bites/sips, and alternate solids with liquids. Continue to monitor and notify SLP if changes occur

## 2023-10-10 NOTE — PROGRESS NOTE ADULT - SUBJECTIVE AND OBJECTIVE BOX
[facility] Division of Hospital Medicine  Joseph Prieto MD  Pager (BELKIS-CASSANDRA, 8A-5P): [phone]  Other Times:  [id number]    Patient is a 83y old  Female who presents with a chief complaint of TIA (09 Oct 2023 13:30)    SUBJECTIVE / OVERNIGHT EVENTS:  Patient offers no new complaints.  No F/C, N/V, CP, SOB, Cough, lightheadedness, dizziness, abdominal pain, diarrhea, dysuria.    MEDICATIONS  (STANDING):  apixaban 5 milliGRAM(s) Oral every 12 hours  atorvastatin 40 milliGRAM(s) Oral at bedtime  carvedilol 12.5 milliGRAM(s) Oral every 12 hours  cloNIDine 0.2 milliGRAM(s) Oral two times a day  furosemide    Tablet 20 milliGRAM(s) Oral daily  hydrALAZINE 50 milliGRAM(s) Oral three times a day  potassium chloride   Powder 20 milliEquivalent(s) Oral once    MEDICATIONS  (PRN):  acetaminophen     Tablet .. 650 milliGRAM(s) Oral every 6 hours PRN Temp greater or equal to 38C (100.4F), Mild Pain (1 - 3)      Vital Signs Last 24 Hrs  T(C): 36.9 (10 Oct 2023 13:30), Max: 37.2 (09 Oct 2023 22:30)  T(F): 98.4 (10 Oct 2023 13:30), Max: 99 (09 Oct 2023 22:30)  HR: 69 (10 Oct 2023 13:30) (69 - 83)  BP: 139/74 (10 Oct 2023 13:30) (124/62 - 168/68)  BP(mean): --  RR: 17 (10 Oct 2023 13:30) (16 - 20)  SpO2: 97% (10 Oct 2023 13:30) (96% - 100%)    Parameters below as of 10 Oct 2023 13:30  Patient On (Oxygen Delivery Method): room air      CAPILLARY BLOOD GLUCOSE      POCT Blood Glucose.: 96 mg/dL (09 Oct 2023 17:10)    I&O's Summary    09 Oct 2023 07:01  -  10 Oct 2023 07:00  --------------------------------------------------------  IN: 400 mL / OUT: 700 mL / NET: -300 mL    10 Oct 2023 07:01  -  10 Oct 2023 14:33  --------------------------------------------------------  IN: 240 mL / OUT: 0 mL / NET: 240 mL        PHYSICAL EXAM:  CONSTITUTIONAL: NAD  EYES: PERRLA; conjunctiva and sclera clear  ENMT: Moist oral mucosa, no pharyngeal injection or exudates; normal dentition  NECK: Supple, goiter noted  RESPIRATORY: Normal respiratory effort; lungs are clear to auscultation bilaterally  CARDIOVASCULAR: Regular rate and rhythm, normal S1 and S2, no murmur/rub/gallop; No lower extremity edema; Peripheral pulses are 2+ bilaterally  ABDOMEN: Nontender to palpation, normoactive bowel sounds, no rebound/guarding; No hepatosplenomegaly  MUSCULOSKELETAL:  Did not assess gait; no clubbing or cyanosis of digits; no joint swelling or tenderness to palpation  PSYCH: A+O to person, place, and time; affect appropriate  NEUROLOGY: CN 2-12 are intact and symmetric; no gross sensory deficits; Rt hemiparesis  SKIN: No rashes; no palpable lesions    LABS:                        13.6   7.94  )-----------( 339      ( 10 Oct 2023 05:01 )             43.4     10-10    135  |  98  |  14  ----------------------------<  91  3.9   |  23  |  0.81    Ca    9.6      10 Oct 2023 05:01  Phos  4.3     10-10  Mg     2.20     10-10            Urinalysis Basic - ( 10 Oct 2023 05:01 )    Color: x / Appearance: x / SG: x / pH: x  Gluc: 91 mg/dL / Ketone: x  / Bili: x / Urobili: x   Blood: x / Protein: x / Nitrite: x   Leuk Esterase: x / RBC: x / WBC x   Sq Epi: x / Non Sq Epi: x / Bacteria: x    Thyroid Stimulating Hormone, Serum: 0.79 uIU/mL (10.08.23 @ 10:29)   Free Thyroxine, Serum: 1.5 ng/dL (10.08.23 @ 10:29)     RADIOLOGY & ADDITIONAL TESTS:    Imaging Personally Reviewed:    Care Discussed with Consultants/Other Providers:

## 2023-10-10 NOTE — SWALLOW BEDSIDE ASSESSMENT ADULT - ASR SWALLOW RECOMMEND DIAG
objective testing not indicated due to no overt signs noted on all PO consistencies provided and recent chest imaging with no concerns for PNA; will continue to follow at bedside at this time

## 2023-10-10 NOTE — SWALLOW BEDSIDE ASSESSMENT ADULT - ADDITIONAL RECOMMENDATIONS
This department will continue to follow the patient for diet tolerance during this admission, as schedule permits.

## 2023-10-10 NOTE — SWALLOW BEDSIDE ASSESSMENT ADULT - COMMENTS
Consult received and chart reviewed. Patient seen at bedside this AM for initial assessment of swallow function, at which time she was alert, oriented x3, and denied pain. Patient engaged in basic English conversational discourse throughout assessment. Patient demonstrates ability to follow low level commands. Vocal quality WFL.     Swallow assessment completed. Patient left as received, NAD. Full report/recommendations to follow. Consult received and chart reviewed. Patient seen at bedside this AM for initial assessment of swallow function, at which time she was alert, oriented x3, and denied pain. Patient engaged in basic English conversational discourse throughout assessment. Patient demonstrates ability to follow low level commands. Vocal quality WFL.        Per charting, the patient is an 84yo F with "PMHx significant for left MCA stroke in 2006 with residual R sided hemiparesis and dysarthria/aphasia, Afib (on apixaban), HTN, who presents to [facility] ED on 10/7/2023, with c/o HTN and R-sided heaviness in her RUE/RLE."    WBC WFL.   CXR 10/9/23 revealed "CHEST: LUNGS AND LARGE AIRWAYS: Patent central airways. No pulmonary nodules. PLEURA: No pleural effusion. VESSELS: Within normal limits."  CT Head 10/17 revealed "CT HEAD: No acute intracranial bleeding. Chronic large left MCA territory infarction. CTA BRAIN: Patent intracranial circulation. No flow-limiting stenosis or occlusion. CTA NECK: Patent cervical vasculature. No flow limiting stenosis or occlusion. No change in large heterogeneous thyroid mass with intrathoracic extension into the right upper lobe with compression of the upper lobe bronchus and associated atelectasis."  MR Head, pending.     Discussed results and recommendations with the patient, RN, and ACP.

## 2023-10-11 ENCOUNTER — TRANSCRIPTION ENCOUNTER (OUTPATIENT)
Age: 83
End: 2023-10-11

## 2023-10-11 LAB
ANION GAP SERPL CALC-SCNC: 12 MMOL/L — SIGNIFICANT CHANGE UP (ref 7–14)
BUN SERPL-MCNC: 26 MG/DL — HIGH (ref 7–23)
CALCIUM SERPL-MCNC: 9.6 MG/DL — SIGNIFICANT CHANGE UP (ref 8.4–10.5)
CHLORIDE SERPL-SCNC: 95 MMOL/L — LOW (ref 98–107)
CO2 SERPL-SCNC: 26 MMOL/L — SIGNIFICANT CHANGE UP (ref 22–31)
CREAT SERPL-MCNC: 1.04 MG/DL — SIGNIFICANT CHANGE UP (ref 0.5–1.3)
EGFR: 53 ML/MIN/1.73M2 — LOW
GLUCOSE SERPL-MCNC: 78 MG/DL — SIGNIFICANT CHANGE UP (ref 70–99)
HCT VFR BLD CALC: 42 % — SIGNIFICANT CHANGE UP (ref 34.5–45)
HGB BLD-MCNC: 12.9 G/DL — SIGNIFICANT CHANGE UP (ref 11.5–15.5)
MAGNESIUM SERPL-MCNC: 2.1 MG/DL — SIGNIFICANT CHANGE UP (ref 1.6–2.6)
MCHC RBC-ENTMCNC: 26.3 PG — LOW (ref 27–34)
MCHC RBC-ENTMCNC: 30.7 GM/DL — LOW (ref 32–36)
MCV RBC AUTO: 85.5 FL — SIGNIFICANT CHANGE UP (ref 80–100)
NRBC # BLD: 0 /100 WBCS — SIGNIFICANT CHANGE UP (ref 0–0)
NRBC # FLD: 0 K/UL — SIGNIFICANT CHANGE UP (ref 0–0)
PHOSPHATE SERPL-MCNC: 4.6 MG/DL — HIGH (ref 2.5–4.5)
PLATELET # BLD AUTO: 323 K/UL — SIGNIFICANT CHANGE UP (ref 150–400)
POTASSIUM SERPL-MCNC: 4.1 MMOL/L — SIGNIFICANT CHANGE UP (ref 3.5–5.3)
POTASSIUM SERPL-SCNC: 4.1 MMOL/L — SIGNIFICANT CHANGE UP (ref 3.5–5.3)
RBC # BLD: 4.91 M/UL — SIGNIFICANT CHANGE UP (ref 3.8–5.2)
RBC # FLD: 14.6 % — HIGH (ref 10.3–14.5)
SODIUM SERPL-SCNC: 133 MMOL/L — LOW (ref 135–145)
WBC # BLD: 7.71 K/UL — SIGNIFICANT CHANGE UP (ref 3.8–10.5)
WBC # FLD AUTO: 7.71 K/UL — SIGNIFICANT CHANGE UP (ref 3.8–10.5)

## 2023-10-11 PROCEDURE — 99232 SBSQ HOSP IP/OBS MODERATE 35: CPT

## 2023-10-11 RX ADMIN — APIXABAN 5 MILLIGRAM(S): 2.5 TABLET, FILM COATED ORAL at 09:48

## 2023-10-11 RX ADMIN — CARVEDILOL PHOSPHATE 12.5 MILLIGRAM(S): 80 CAPSULE, EXTENDED RELEASE ORAL at 05:20

## 2023-10-11 RX ADMIN — ATORVASTATIN CALCIUM 40 MILLIGRAM(S): 80 TABLET, FILM COATED ORAL at 21:04

## 2023-10-11 RX ADMIN — Medication 50 MILLIGRAM(S): at 05:20

## 2023-10-11 RX ADMIN — Medication 0.2 MILLIGRAM(S): at 05:20

## 2023-10-11 RX ADMIN — Medication 0.2 MILLIGRAM(S): at 17:10

## 2023-10-11 RX ADMIN — APIXABAN 5 MILLIGRAM(S): 2.5 TABLET, FILM COATED ORAL at 21:04

## 2023-10-11 RX ADMIN — CARVEDILOL PHOSPHATE 12.5 MILLIGRAM(S): 80 CAPSULE, EXTENDED RELEASE ORAL at 17:10

## 2023-10-11 RX ADMIN — Medication 20 MILLIGRAM(S): at 05:19

## 2023-10-11 NOTE — PROGRESS NOTE ADULT - PROBLEM SELECTOR PLAN 4
Continue Eliquis  - rate control with Coreg

## 2023-10-11 NOTE — PHARMACOTHERAPY INTERVENTION NOTE - COMMENTS
Patient counseled at bedside regarding atorvastatin, its indication, administration, potential side effects, and emphasized to follow up with primary care provider. Patient verbalized understanding and was provided an informational drug info card.     Mohammad Harris Jalili, PharmD  PGY-1 Pharmacy Resident  spectra: [phone]; ext: [phone]  Available on Teams

## 2023-10-11 NOTE — DISCHARGE NOTE PROVIDER - NSDCCPCAREPLAN_GEN_ALL_CORE_FT
PRINCIPAL DISCHARGE DIAGNOSIS  Diagnosis: Right sided weakness  Assessment and Plan of Treatment: You were evaluated for cause for new right sided weakness.  Your BP was very elevated on admission and medication was optimized for improved BP which also help resolve your right sided weakness. Given your previous history of CVA, you were evaluated by neurology and MR of Brain was performed which shows ______      SECONDARY DISCHARGE DIAGNOSES  Diagnosis: Thyroid mass  Assessment and Plan of Treatment: You were diagnosed with enlarged thyroid gland.  There was no mass effect and your thyroid function test was within normal limits.  You were advised to follow up with endocrinologist for further management as outpatient.     PRINCIPAL DISCHARGE DIAGNOSIS  Diagnosis: Right sided weakness  Assessment and Plan of Treatment: You were evaluated for cause for new right sided weakness.  Your BP was very elevated on admission and medication was optimized for improved BP which also help resolve your right sided weakness. Given your previous history of CVA, you were evaluated by neurology and MR of Brain was performed which shows your old stroke but no new findings.      SECONDARY DISCHARGE DIAGNOSES  Diagnosis: Thyroid mass  Assessment and Plan of Treatment: You were diagnosed with enlarged thyroid gland.  There was no mass effect and your thyroid function test was within normal limits.  You were advised to follow up with endocrinologist for further management as outpatient.

## 2023-10-11 NOTE — DISCHARGE NOTE PROVIDER - ATTENDING DISCHARGE PHYSICAL EXAMINATION:
Physical Exam:     General: No acute distress, well-appearing    Pulm: No increased WOB. CTAB. No wheezing.     CV: RRR. S1&S2+. No M/R/G appreciated.     Abdomen: +BS. Soft, NTND. No organomegaly.     Extremities: No peripheral edema or cyanosis.     Neuro: A&Ox3, no focal deficits     Skin: Warm and dry. No visible rash.

## 2023-10-11 NOTE — PROGRESS NOTE ADULT - ASSESSMENT
83F Thyroid mass partial resection, Afib - Eliquis, CVA w/ Rt hemiparesis, HTN, TR w/ PulmHTN, p/w worsening Rt hemiparesis c/b HTN emergency, enlarging thyroid mass - likely due to non-functioning goiter.

## 2023-10-11 NOTE — PROGRESS NOTE ADULT - PROBLEM SELECTOR PLAN 1
concern for acute CVA vs HTN emergency as cause for neurological deficits  - neurological symptoms improved with improved BP control  - MR Brain pending  - continue Eliquis  - no permissive HTN  - PT/OT eval - patient wishes to go home despite PT recs for MARY.
concern for acute CVA vs HTN emergency as cause for neurological deficits  - neurological symptoms improved with improved BP control  - MR Brain pending  - continue Eliquis  - no permissive HTN
concern for acute CVA vs HTN emergency as cause for neurological deficits  - neurological symptoms improved with improved BP control  - MR Brain pending - asked to expedite.  - continue Eliquis  - no permissive HTN  - PT/OT eval - patient wishes to go home despite PT recs for MARY.

## 2023-10-11 NOTE — DISCHARGE NOTE PROVIDER - NSDCMRMEDTOKEN_GEN_ALL_CORE_FT
carvedilol 12.5 mg oral tablet: 1 tab(s) orally 2 times a day  cloNIDine 0.2 mg oral tablet: 1 tab(s) orally 2 times a day  Eliquis 5 mg oral tablet: 1 tab(s) orally 2 times a day  furosemide 20 mg oral tablet: 1 tab(s) orally once a day in am  hydrALAZINE 50 mg oral tablet: 1 tab(s) orally 3 times a day   atorvastatin 40 mg oral tablet: 1 tab(s) orally once a day (at bedtime)  carvedilol 12.5 mg oral tablet: 1 tab(s) orally 2 times a day  cloNIDine 0.2 mg oral tablet: 1 tab(s) orally 2 times a day  Eliquis 5 mg oral tablet: 1 tab(s) orally 2 times a day  furosemide 20 mg oral tablet: 1 tab(s) orally once a day in am  hydrALAZINE 50 mg oral tablet: 1 tab(s) orally 3 times a day

## 2023-10-11 NOTE — DISCHARGE NOTE PROVIDER - NSDCCPTREATMENT_GEN_ALL_CORE_FT
PRINCIPAL PROCEDURE  Procedure: MRI head  Findings and Treatment: IMPRESSION:  No acute infarct, intracranial hemorrhage, hydrocephalus, mass effect, or   midline shift. No suspicious lesions or abnormal enhancement.  Chronic left MCA territory infarct and age-related involutional and   chronic microvascular-type changes.       PRINCIPAL PROCEDURE  Procedure: MRI head  Findings and Treatment: IMPRESSION:  No acute infarct, intracranial hemorrhage, hydrocephalus, mass effect, or   midline shift. No suspicious lesions or abnormal enhancement.  Chronic left MCA territory infarct and age-related involutional and   chronic microvascular-type changes.        SECONDARY PROCEDURE  Procedure: CT chest, with contrast  Findings and Treatment: IMPRESSION:  *  No evidence of suspicious mass or lymphadenopathy in the chest,   abdomen, or pelvis.  *  Substernal extension of thyroid goiter.  *  Left adrenal adenoma, increased in size since prior examination   5/20/2008.  *  Nodular liver, suggesting cirrhosis.  *  Cardiomegaly.

## 2023-10-11 NOTE — DISCHARGE NOTE PROVIDER - NSFOLLOWUPCLINICS_GEN_ALL_ED_FT
[facility]  Endocrinology  [address] [place] [address]  Phone: [phone]  Fax:   Follow Up Time: 1 month

## 2023-10-11 NOTE — DISCHARGE NOTE PROVIDER - CARE PROVIDER_API CALL
Vincent Velazquez  Neurology  [address], [address]  [place] [address]  Phone: [phone]  Fax: [phone]  Follow Up Time:

## 2023-10-11 NOTE — PROGRESS NOTE ADULT - SUBJECTIVE AND OBJECTIVE BOX
[facility] Division of Hospital Medicine  Joseph Prieto MD  Pager (BELKIS-F, 8A-5P): [phone]  Other Times:  [id number]    Patient is a 83y old  Female who presents with a chief complaint of TIA (10 Oct 2023 14:32)    SUBJECTIVE / OVERNIGHT EVENTS:  Upset that MRI still has not been done. Offers no new complaints. No F/C, N/V, CP, SOB, Cough, lightheadedness, dizziness, abdominal pain, diarrhea, dysuria.    MEDICATIONS  (STANDING):  apixaban 5 milliGRAM(s) Oral every 12 hours  atorvastatin 40 milliGRAM(s) Oral at bedtime  carvedilol 12.5 milliGRAM(s) Oral every 12 hours  cloNIDine 0.2 milliGRAM(s) Oral two times a day  furosemide    Tablet 20 milliGRAM(s) Oral daily  hydrALAZINE 50 milliGRAM(s) Oral three times a day    MEDICATIONS  (PRN):  acetaminophen     Tablet .. 650 milliGRAM(s) Oral every 6 hours PRN Temp greater or equal to 38C (100.4F), Mild Pain (1 - 3)      Vital Signs Last 24 Hrs  T(C): 36.8 (11 Oct 2023 13:00), Max: 37.2 (10 Oct 2023 21:25)  T(F): 98.2 (11 Oct 2023 13:00), Max: 98.9 (10 Oct 2023 21:25)  HR: 65 (11 Oct 2023 13:00) (65 - 83)  BP: 111/64 (11 Oct 2023 13:00) (102/60 - 151/69)  BP(mean): --  RR: 18 (11 Oct 2023 13:00) (18 - 18)  SpO2: 96% (11 Oct 2023 13:00) (95% - 98%)    Parameters below as of 11 Oct 2023 13:00  Patient On (Oxygen Delivery Method): room air      CAPILLARY BLOOD GLUCOSE        I&O's Summary    10 Oct 2023 07:01  -  11 Oct 2023 07:00  --------------------------------------------------------  IN: 360 mL / OUT: 400 mL / NET: -40 mL    11 Oct 2023 07:01  -  11 Oct 2023 14:32  --------------------------------------------------------  IN: 120 mL / OUT: 200 mL / NET: -80 mL        PHYSICAL EXAM:  CONSTITUTIONAL: NAD  EYES: PERRLA; conjunctiva and sclera clear  ENMT: Moist oral mucosa, no pharyngeal injection or exudates; normal dentition  NECK: Supple, goiter noted  RESPIRATORY: Normal respiratory effort; lungs are clear to auscultation bilaterally  CARDIOVASCULAR: Regular rate and rhythm, normal S1 and S2, no murmur/rub/gallop; No lower extremity edema; Peripheral pulses are 2+ bilaterally  ABDOMEN: Nontender to palpation, normoactive bowel sounds, no rebound/guarding; No hepatosplenomegaly  MUSCULOSKELETAL:  Did not assess gait; no clubbing or cyanosis of digits; no joint swelling or tenderness to palpation  PSYCH: A+O to person, place, and time; affect appropriate  NEUROLOGY: CN 2-12 are intact and symmetric; no gross sensory deficits; Rt hemiparesis  SKIN: No rashes; no palpable lesions    LABS:                        12.9   7.71  )-----------( 323      ( 11 Oct 2023 06:00 )             42.0     10-11    133<L>  |  95<L>  |  26<H>  ----------------------------<  78  4.1   |  26  |  1.04    Ca    9.6      11 Oct 2023 06:00  Phos  4.6     10-11  Mg     2.10     10-11            Urinalysis Basic - ( 11 Oct 2023 06:00 )    Color: x / Appearance: x / SG: x / pH: x  Gluc: 78 mg/dL / Ketone: x  / Bili: x / Urobili: x   Blood: x / Protein: x / Nitrite: x   Leuk Esterase: x / RBC: x / WBC x   Sq Epi: x / Non Sq Epi: x / Bacteria: x        RADIOLOGY & ADDITIONAL TESTS:    Imaging Personally Reviewed:    Care Discussed with Consultants/Other Providers:

## 2023-10-11 NOTE — PROGRESS NOTE ADULT - PROBLEM SELECTOR PLAN 2
BP control Coreg, clonidine, hydralazine, Lasix

## 2023-10-11 NOTE — DISCHARGE NOTE PROVIDER - HOSPITAL COURSE
83F Thyroid mass partial resection, Afib - Eliquis, CVA w/ Rt hemiparesis, HTN, TR w/ PulmHTN, p/w worsening Rt hemiparesis c/b HTN emergency, enlarging thyroid mass - likely due to non-functioning goiter.        Problem/Plan - 1:  ·  Problem: Right sided weakness.   ·  Plan: concern for acute CVA vs HTN emergency as cause for neurological deficits  - neurological symptoms improved with improved BP control  - MR Brain pending - asked to expedite.  - continue Eliquis  - no permissive HTN  - PT/OT eval - patient wishes to go home despite PT recs for MARY.     Problem/Plan - 2:  ·  Problem: Hypertension.   ·  Plan: BP control Coreg, clonidine, hydralazine, Lasix.     Problem/Plan - 3:  ·  Problem: Thyroid mass.   ·  Plan: suspect chronic issue - enlarging goiter  - TFT reviewed - no abnormality  - outpatient w/u and evaluation with endocrinologist.     Problem/Plan - 4:  ·  Problem: Atrial fibrillation.   ·  Plan: Continue Eliquis  - rate control with Coreg. 83F Thyroid mass partial resection, Afib - Eliquis, CVA w/ Rt hemiparesis, HTN, TR w/ PulmHTN, p/w worsening Rt hemiparesis c/b HTN emergency, enlarging thyroid mass - likely due to non-functioning goiter.        Problem/Plan - 1:  ·  Problem: Right sided weakness.   ·  Plan: concern for acute CVA vs HTN emergency as cause for neurological deficits  - neurological symptoms improved with improved BP control  - MR Brain shows Chronic left MCA territory infarct and age-related involutional and   chronic microvascular-type changes.  - continue Eliquis  - no permissive HTN  - PT/OT eval - patient wishes to go home despite PT recs for MARY.     Problem/Plan - 2:  ·  Problem: Hypertension.   ·  Plan: BP control Coreg, clonidine, hydralazine, Lasix.     Problem/Plan - 3:  ·  Problem: Thyroid mass.   ·  Plan: suspect chronic issue - enlarging goiter  - TFT reviewed - no abnormality  - outpatient w/u and evaluation with endocrinologist.     Problem/Plan - 4:  ·  Problem: Atrial fibrillation.   ·  Plan: Continue Eliquis  - rate control with Coreg.    Pt hemodynamically stable for discharge to home with close follow up with PCP, Neuro, and Endo.

## 2023-10-11 NOTE — PROGRESS NOTE ADULT - PROBLEM SELECTOR PLAN 3
suspect chronic issue - enlarging goiter  - TFT reviewed - no abnormality  - outpatient w/u and evaluation with endocrinologist
suspect chronic issue - enlarging goiter  - check TFT  - favor outpatient w/u and evaluation with endocrinologist
suspect chronic issue - enlarging goiter  - TFT reviewed - no abnormality  - outpatient w/u and evaluation with endocrinologist

## 2023-10-12 ENCOUNTER — TRANSCRIPTION ENCOUNTER (OUTPATIENT)
Age: 83
End: 2023-10-12

## 2023-10-12 VITALS — SYSTOLIC BLOOD PRESSURE: 170 MMHG | DIASTOLIC BLOOD PRESSURE: 76 MMHG

## 2023-10-12 LAB
ANION GAP SERPL CALC-SCNC: 13 MMOL/L — SIGNIFICANT CHANGE UP (ref 7–14)
BUN SERPL-MCNC: 22 MG/DL — SIGNIFICANT CHANGE UP (ref 7–23)
CALCIUM SERPL-MCNC: 9.6 MG/DL — SIGNIFICANT CHANGE UP (ref 8.4–10.5)
CHLORIDE SERPL-SCNC: 99 MMOL/L — SIGNIFICANT CHANGE UP (ref 98–107)
CO2 SERPL-SCNC: 24 MMOL/L — SIGNIFICANT CHANGE UP (ref 22–31)
CREAT SERPL-MCNC: 0.79 MG/DL — SIGNIFICANT CHANGE UP (ref 0.5–1.3)
CULTURE RESULTS: SIGNIFICANT CHANGE UP
EGFR: 74 ML/MIN/1.73M2 — SIGNIFICANT CHANGE UP
GLUCOSE SERPL-MCNC: 90 MG/DL — SIGNIFICANT CHANGE UP (ref 70–99)
HCT VFR BLD CALC: 43 % — SIGNIFICANT CHANGE UP (ref 34.5–45)
HGB BLD-MCNC: 13.8 G/DL — SIGNIFICANT CHANGE UP (ref 11.5–15.5)
MAGNESIUM SERPL-MCNC: 2.1 MG/DL — SIGNIFICANT CHANGE UP (ref 1.6–2.6)
MCHC RBC-ENTMCNC: 27.3 PG — SIGNIFICANT CHANGE UP (ref 27–34)
MCHC RBC-ENTMCNC: 32.1 GM/DL — SIGNIFICANT CHANGE UP (ref 32–36)
MCV RBC AUTO: 85.1 FL — SIGNIFICANT CHANGE UP (ref 80–100)
NRBC # BLD: 0 /100 WBCS — SIGNIFICANT CHANGE UP (ref 0–0)
NRBC # FLD: 0 K/UL — SIGNIFICANT CHANGE UP (ref 0–0)
PHOSPHATE SERPL-MCNC: 3.8 MG/DL — SIGNIFICANT CHANGE UP (ref 2.5–4.5)
PLATELET # BLD AUTO: 328 K/UL — SIGNIFICANT CHANGE UP (ref 150–400)
POTASSIUM SERPL-MCNC: 4.6 MMOL/L — SIGNIFICANT CHANGE UP (ref 3.5–5.3)
POTASSIUM SERPL-SCNC: 4.6 MMOL/L — SIGNIFICANT CHANGE UP (ref 3.5–5.3)
RBC # BLD: 5.05 M/UL — SIGNIFICANT CHANGE UP (ref 3.8–5.2)
RBC # FLD: 14.6 % — HIGH (ref 10.3–14.5)
SODIUM SERPL-SCNC: 136 MMOL/L — SIGNIFICANT CHANGE UP (ref 135–145)
SPECIMEN SOURCE: SIGNIFICANT CHANGE UP
WBC # BLD: 7.31 K/UL — SIGNIFICANT CHANGE UP (ref 3.8–10.5)
WBC # FLD AUTO: 7.31 K/UL — SIGNIFICANT CHANGE UP (ref 3.8–10.5)

## 2023-10-12 PROCEDURE — 70553 MRI BRAIN STEM W/O & W/DYE: CPT | Mod: 26

## 2023-10-12 PROCEDURE — 99238 HOSP IP/OBS DSCHRG MGMT 30/<: CPT

## 2023-10-12 RX ORDER — ATORVASTATIN CALCIUM 80 MG/1
1 TABLET, FILM COATED ORAL
Qty: 30 | Refills: 0
Start: 2023-10-12 | End: 2023-11-10

## 2023-10-12 RX ORDER — HYDRALAZINE HCL 50 MG
10 TABLET ORAL ONCE
Refills: 0 | Status: COMPLETED | OUTPATIENT
Start: 2023-10-12 | End: 2023-10-12

## 2023-10-12 RX ADMIN — Medication 50 MILLIGRAM(S): at 21:04

## 2023-10-12 RX ADMIN — Medication 0.2 MILLIGRAM(S): at 17:06

## 2023-10-12 RX ADMIN — APIXABAN 5 MILLIGRAM(S): 2.5 TABLET, FILM COATED ORAL at 21:03

## 2023-10-12 RX ADMIN — Medication 0.2 MILLIGRAM(S): at 05:26

## 2023-10-12 RX ADMIN — ATORVASTATIN CALCIUM 40 MILLIGRAM(S): 80 TABLET, FILM COATED ORAL at 21:04

## 2023-10-12 RX ADMIN — APIXABAN 5 MILLIGRAM(S): 2.5 TABLET, FILM COATED ORAL at 09:21

## 2023-10-12 RX ADMIN — CARVEDILOL PHOSPHATE 12.5 MILLIGRAM(S): 80 CAPSULE, EXTENDED RELEASE ORAL at 05:26

## 2023-10-12 RX ADMIN — Medication 10 MILLIGRAM(S): at 23:39

## 2023-10-12 RX ADMIN — Medication 50 MILLIGRAM(S): at 05:26

## 2023-10-12 RX ADMIN — CARVEDILOL PHOSPHATE 12.5 MILLIGRAM(S): 80 CAPSULE, EXTENDED RELEASE ORAL at 17:06

## 2023-10-12 RX ADMIN — Medication 20 MILLIGRAM(S): at 05:26

## 2023-10-12 NOTE — PROVIDER CONTACT NOTE (OTHER) - SITUATION
/98
SW notified that pt d/c transport home via ambulance has yet to arrive at 10PM. Writer reviewed chart with trip possibly pending with [facility] set up for 7pm. Writer unable to confirm/reach
Patient /110
5 beats vtach
Patient /98
Elevated /97 before discharge to home
Patient BP 93/46
/78

## 2023-10-12 NOTE — DISCHARGE NOTE NURSING/CASE MANAGEMENT/SOCIAL WORK - NSTRANSFERDENTURES_GEN_A_NUR
The patient called asking for a refill of the HYDROcodone-acetaminophen (NORCO) 7.5-325 MG sent to the [facility].   full

## 2023-10-12 NOTE — CHART NOTE - NSCHARTNOTEFT_GEN_A_CORE
Pt seen/examined with son at bedside this AM, no subjective complaints. States the right sided weakness she came in with has improved to baseline. All questions/concerns addressed. S/p MR Head this AM, stable old CVA. Plan for discharge to home today. See discharge note provider for additional details.

## 2023-10-12 NOTE — PROGRESS NOTE ADULT - ASSESSMENT
TIFFANY LIM is a 83y (1940) RIGHT handed woman with a PMHx significant for left MCA stroke in 2006 with residual R sided hemiparesis and dysarthria/aphasia, Afib (on apixaban), HTN, who presents to [facility] ED on 10/7/2023, with c/o HTN and R-sided heaviness in her RUE/RLE. Reports this began at 9 AM today (although told ED it was 8 AM and also 21:00 on 10/6). BP reported 223/99 when she presented and 220/100 in the field. Patient also c/o SOB. Per sister, who is bedside - patient has been having trouble controlling her BP recently and PCP has been titrating medications. Says she felt her R leg and R arm get heavy this AM. Says it did not feel like her previous stroke (which she presented with complete R sided hemiparesis and severe aphasia). This lasted several hours (most of the AM). Struggling to move with EMS per sister. There were no changes in speech, no sensory issues, no vision changes. Uses a cane to walk. Cannot climb stairs. Does not drive. Has a 24 hour aide at home. Does not manage household chores but can toilet herself. No history of MI. Is reportedly compliant with her apixaban. Cannot use her R hand anymore - has switched to left hand but was originally R handed. Denies CP. No sick contacts, not sick recently. Normal BM/urinating OK. In her normal state of health prior to this. Vitals notable for: /99. Labs notable for: /99. CT imaging disclosed: known prior infarct. Exam notable for chronic R sided weakness and sensory deficit, aphasia/dysarthria. Patient back to her baseline exam per her report.    NIHSS: 5 (dysarthria, aphasia, drift RUE, antigravity RLE)  ABCD2 score: 6  LKN: Varies per history but per most recent report - 10/7/2023, 9:00 AM  pre-MRS: 4    Impression: Presenting with worsening of residual R sided deficits i/s/o uncontrolled HTN - per patient, she is now back to her baseline - c/f (high risk) TIA vs recrudescence of old stroke i/s/o uncontrolled HTN    Recommendations:  [] Admit to Medicine  [] Telemetry monitoring  [] SBP goal <180  [] Continue home apixaban 5mg BID  [] Atorvastatin 80mg (titrate LDL<70)  [] HgbA1C 15.9, LDL 73  [] MRI brain w/o contrast, pending official read   [] Does not need TTE for stroke workup (has known Afib)  [] Neurochecks, vitals per unit routine  [] Fall and aspiration precautions  [] PT/OT/SLP/CM  [] Diet: DASH/TLC  [] DVT prophylaxis: on eliquis   [] Upon discharge, PT should F/U with Dr. Velazquez: [phone] [address] [place] [address]      NO tenecteplase d/t no new neurologic findings (symptoms resolved).  NO thrombectomy d/t no LVO.    Patient seen and discussed with stroke attending.      Please call [phone] with any questions.    TIFFANY LIM is a 83y (1940) RIGHT handed woman with a PMHx significant for left MCA stroke in 2006 with residual R sided hemiparesis and dysarthria/aphasia, Afib (on apixaban), HTN, who presents to [facility] ED on 10/7/2023, with c/o HTN and R-sided heaviness in her RUE/RLE. Reports this began at 9 AM today (although told ED it was 8 AM and also 21:00 on 10/6). BP reported 223/99 when she presented and 220/100 in the field. Patient also c/o SOB. Per sister, who is bedside - patient has been having trouble controlling her BP recently and PCP has been titrating medications. Says she felt her R leg and R arm get heavy this AM. Says it did not feel like her previous stroke (which she presented with complete R sided hemiparesis and severe aphasia). This lasted several hours (most of the AM). Struggling to move with EMS per sister. There were no changes in speech, no sensory issues, no vision changes. Uses a cane to walk. Cannot climb stairs. Does not drive. Has a 24 hour aide at home. Does not manage household chores but can toilet herself. No history of MI. Is reportedly compliant with her apixaban. Cannot use her R hand anymore - has switched to left hand but was originally R handed. Denies CP. No sick contacts, not sick recently. Normal BM/urinating OK. In her normal state of health prior to this. Vitals notable for: /99. Labs notable for: /99. CT imaging disclosed: known prior infarct. Exam notable for chronic R sided weakness and sensory deficit, aphasia/dysarthria. Patient back to her baseline exam per her report.    NIHSS: 5 (dysarthria, aphasia, drift RUE, antigravity RLE)  ABCD2 score: 6  LKN: Varies per history but per most recent report - 10/7/2023, 9:00 AM  pre-MRS: 4    Impression: Presenting with worsening of residual R sided deficits i/s/o uncontrolled HTN - per patient, she is now back to her baseline - c/f (high risk) TIA vs recrudescence of old stroke i/s/o uncontrolled HTN    Recommendations:  [] Admit to Medicine  [] Telemetry monitoring  [] SBP goal <180  [] Continue home apixaban 5mg BID  [] Atorvastatin 80mg (titrate LDL<70)  [] HgbA1C 15.9, LDL 73  [] MRI brain w/o contrast, pending official read --> chronic L MCA infarct but no acute infarct   [] Does not need TTE for stroke workup (has known Afib)  [] Neurochecks, vitals per unit routine  [] Fall and aspiration precautions  [] PT/OT/SLP/CM  [] Diet: DASH/TLC  [] DVT prophylaxis: on eliquis   [] Upon discharge, PT should F/U with Dr. Velazquez: [phone] [address] [place] [address]      NO tenecteplase d/t no new neurologic findings (symptoms resolved).  NO thrombectomy d/t no LVO.    Patient seen and discussed with stroke attending.      Please call [phone] with any questions.

## 2023-10-12 NOTE — PROGRESS NOTE ADULT - NS ATTEND AMEND GEN_ALL_CORE FT
agree with above   no acute infarct   chronic appearing L MCA stroke   c/w DOAC and statin therapy     Vincent Velazquez MD  Vascular Neurology  Office: [phone]

## 2023-10-12 NOTE — PROVIDER CONTACT NOTE (OTHER) - NAME OF MD/NP/PA/DO NOTIFIED:
ACP Go [id number]
[facility] Go Valles [id number]
Isma Martinez
ACP Lilli Holcomb
MATTIE Avila
MATTIE Avila

## 2023-10-12 NOTE — PROVIDER CONTACT NOTE (OTHER) - BACKGROUND
representative at [facility] number. Writer therefore arranged trip as bill back with [facility] #[phone]. Writer spoke with Brianne who provided trip #[id number] for p/up within the next 60 -90

## 2023-10-12 NOTE — PROGRESS NOTE ADULT - TIME BILLING
-review of the history and records  -general and neurological examination  -generation of assessment and treatment plan  -communication with the patient/family members  -coordination of care.
Preparing to see the patient including review of tests and other providers' notes, confirming history with family member, performing medical examination and evaluation, counseling and educating the patient/family/caregiver, ordering medications, tests and procedures, communicating with other health care professionals, documenting clinical information in the EMR, independently interpreting results and communicating results to the patient/family/caregiven, care coordination.

## 2023-10-12 NOTE — PROVIDER CONTACT NOTE (OTHER) - DATE AND TIME:
09-Oct-2023 15:42
12-Oct-2023 17:07
12-Oct-2023 23:25
12-Oct-2023 13:11
08-Oct-2023 18:53
12-Oct-2023 22:45
08-Oct-2023 09:58
08-Oct-2023 17:10

## 2023-10-12 NOTE — PROGRESS NOTE ADULT - SUBJECTIVE AND OBJECTIVE BOX
SUBJECTIVE:     INTERVAL HISTORY:    PAST MEDICAL & SURGICAL HISTORY:  CVA (cerebral vascular accident)  2008, right side weakness and impaired speech since CVA      Hypertension      Atrial fibrillation  currently on Eliquis since summer 2017      Other specified disorders of nose and nasal sinuses      Tricuspid regurgitation      Bradycardia      H/O: hysterectomy  30 years ago      Disorder of thyroid  s/p partial thyroidectomy 35 years ago        FAMILY HISTORY:  Family history of heart attack (Mother)  mother- age 70    Family history of atrial fibrillation (Father, Sibling)    Family history of cerebrovascular accident (CVA) (Father)      SOCIAL HISTORY:   T/E/D:   Occupation:   Lives with:     MEDICATIONS (HOME):  Home Medications:  carvedilol 12.5 mg oral tablet: 1 tab(s) orally 2 times a day (23 Jan 2018 13:21)  cloNIDine 0.2 mg oral tablet: 1 tab(s) orally 2 times a day (23 Jan 2018 13:21)  Eliquis 5 mg oral tablet: 1 tab(s) orally 2 times a day (23 Jan 2018 13:21)  furosemide 20 mg oral tablet: 1 tab(s) orally once a day in am (23 Jan 2018 13:21)  hydrALAZINE 50 mg oral tablet: 1 tab(s) orally 3 times a day (08 Oct 2023 11:27)    MEDICATIONS  (STANDING):  apixaban 5 milliGRAM(s) Oral every 12 hours  atorvastatin 40 milliGRAM(s) Oral at bedtime  carvedilol 12.5 milliGRAM(s) Oral every 12 hours  cloNIDine 0.2 milliGRAM(s) Oral two times a day  furosemide    Tablet 20 milliGRAM(s) Oral daily  hydrALAZINE 50 milliGRAM(s) Oral three times a day    MEDICATIONS  (PRN):  acetaminophen     Tablet .. 650 milliGRAM(s) Oral every 6 hours PRN Temp greater or equal to 38C (100.4F), Mild Pain (1 - 3)    ALLERGIES/INTOLERANCES:  Allergies  No Known Allergies    Intolerances    VITALS & EXAMINATION:  Vital Signs Last 24 Hrs  T(C): 37 (12 Oct 2023 05:00), Max: 37.1 (11 Oct 2023 21:03)  T(F): 98.6 (12 Oct 2023 05:00), Max: 98.7 (11 Oct 2023 21:03)  HR: 67 (12 Oct 2023 05:00) (62 - 67)  BP: 131/72 (12 Oct 2023 05:00) (111/64 - 135/78)  BP(mean): --  RR: 18 (12 Oct 2023 05:00) (17 - 18)  SpO2: 99% (12 Oct 2023 05:00) (96% - 99%)    Parameters below as of 12 Oct 2023 05:00  Patient On (Oxygen Delivery Method): room air        General:  Constitutional: Female, appears stated age, in no apparent distress including pain  Head: Normocephalic & Atraumatic.  Respiratory: Breathing comfortably.  Extremities: No cyanosis, clubbing, or edema    Neurological:  MS: Awake, alert, oriented to person, place, situation, time. Follows all commands.    Language: Speech is clear, fluent with good repetition & comprehension.    CNs: PERRL (R = 3mm, L = 3mm). VFF. EOMI no nystagmus. V1-3 intact to LT b/l. No facial asymmetry b/l, full eye closure strength b/l. Hearing grossly normal (rubbing fingers) b/l. Tongue midline, normal movements, no atrophy.     Motor: Normal muscle bulk & tone. No noticeable tremor. No pronator drift.              Deltoid	Biceps	Triceps	   R	         5	             5	              5	 5	  		 	  L	         5	             5	              5	 5	  		    	H-Flex	K-Flex	K-Ext	D-Flex	P-Flex  R	    5	            5	           5	            5	           5		   L	    5	            5	           5	            5	           5		     Sensation: Intact to LT b/l throughout.     Cortical: Extinction on DSS (neglect): none    Reflexes:              Biceps(C5)       BR(C6)     Triceps(C7)               Patellar(L4)    Achilles(S1)    Plantar Resp  R	              2	          2	             2		                              2		    2		      Down   L	              2	          2	             2		                              2		    2		      Down     Coordination: intact rapid-alt movements. No dysmetria to FTN/HTS    Gait:     LABORATORY:  CBC                       13.8   7.31  )-----------( 328      ( 12 Oct 2023 05:32 )             43.0     Chem 10-12    136  |  99  |  22  ----------------------------<  90  4.6   |  24  |  0.79    Ca    9.6      12 Oct 2023 05:32  Phos  3.8     10-12  Mg     2.10     10-12      LFTs   Coagulopathy   Lipid Panel 10-08 Chol 208<H> LDL -- HDL 71 Trig 65  A1c   Cardiac enzymes     U/A Urinalysis Basic - ( 12 Oct 2023 05:32 )    Color: x / Appearance: x / SG: x / pH: x  Gluc: 90 mg/dL / Ketone: x  / Bili: x / Urobili: x   Blood: x / Protein: x / Nitrite: x   Leuk Esterase: x / RBC: x / WBC x   Sq Epi: x / Non Sq Epi: x / Bacteria: x      CSF  Immunological  Other    STUDIES & IMAGING:  Studies (EKG, EEG, EMG, etc):     Radiology (XR, CT, MR, U/S, TTE/NYASIA): SUBJECTIVE: Patient feeling better.     INTERVAL HISTORY:  MRI done, pending official read.  No events overnight.  Patient seen and examined at bedside with stroke attending and team.     PAST MEDICAL & SURGICAL HISTORY:  CVA (cerebral vascular accident)  2008, right side weakness and impaired speech since CVA  Hypertension  Atrial fibrillation  currently on Eliquis since summer 2017  Other specified disorders of nose and nasal sinuses  Tricuspid regurgitation  Bradycardia  H/O: hysterectomy  30 years ago  Disorder of thyroid  s/p partial thyroidectomy 35 years ago    FAMILY HISTORY:  Family history of heart attack (Mother)  mother- age 70  Family history of atrial fibrillation (Father, Sibling)  Family history of cerebrovascular accident (CVA) (Father)    SOCIAL HISTORY:   T/E/D:   Occupation:   Lives with:     MEDICATIONS (HOME):  Home Medications:  carvedilol 12.5 mg oral tablet: 1 tab(s) orally 2 times a day (23 Jan 2018 13:21)  cloNIDine 0.2 mg oral tablet: 1 tab(s) orally 2 times a day (23 Jan 2018 13:21)  Eliquis 5 mg oral tablet: 1 tab(s) orally 2 times a day (23 Jan 2018 13:21)  furosemide 20 mg oral tablet: 1 tab(s) orally once a day in am (23 Jan 2018 13:21)  hydrALAZINE 50 mg oral tablet: 1 tab(s) orally 3 times a day (08 Oct 2023 11:27)    MEDICATIONS  (STANDING):  apixaban 5 milliGRAM(s) Oral every 12 hours  atorvastatin 40 milliGRAM(s) Oral at bedtime  carvedilol 12.5 milliGRAM(s) Oral every 12 hours  cloNIDine 0.2 milliGRAM(s) Oral two times a day  furosemide    Tablet 20 milliGRAM(s) Oral daily  hydrALAZINE 50 milliGRAM(s) Oral three times a day    MEDICATIONS  (PRN):  acetaminophen     Tablet .. 650 milliGRAM(s) Oral every 6 hours PRN Temp greater or equal to 38C (100.4F), Mild Pain (1 - 3)    ALLERGIES/INTOLERANCES:  Allergies  No Known Allergies    Intolerances    VITALS & EXAMINATION:  Vital Signs Last 24 Hrs  T(C): 37 (12 Oct 2023 05:00), Max: 37.1 (11 Oct 2023 21:03)  T(F): 98.6 (12 Oct 2023 05:00), Max: 98.7 (11 Oct 2023 21:03)  HR: 67 (12 Oct 2023 05:00) (62 - 67)  BP: 131/72 (12 Oct 2023 05:00) (111/64 - 135/78)  RR: 18 (12 Oct 2023 05:00) (17 - 18)  SpO2: 99% (12 Oct 2023 05:00) (96% - 99%)    Parameters below as of 12 Oct 2023 05:00  Patient On (Oxygen Delivery Method): room air    Physical Examination:  General - NAD, lying supine in bed  Cardiovascular - No LE edema  Eyes - Fundoscopy not performed due to safety precautions in the setting of infection risk, wearing eyeglasses    Neurologic Exam:  Mental status - Eyes open, awake, Alert, Oriented to person, place, and time. Moderately dysarthric (can be understood). Moderate aphasia. Repetition and naming intact. Follows simple and complex commands. Attention/concentration, recent and remote memory (including registration - although took 2 attempts for 3 words and recall), and fund of knowledge intact  Cranial nerves - PERRLA, VFF, EOMI, face sensation (V1-V3) intact b/l, facial strength intact without asymmetry b/l, hearing intact b/l, palate with symmetric elevation, trapezius 5/5 strength LEFT side, weaker on right, tongue midline on protrusion with full lateral movement  Motor - Atrophy R leg vs L leg, cannot assess pronator drift on R side (none on left)  Strength testing  No drift LUE, Drift RUE  RLE is barely antigravity, RLE KF 3, KE 3  DF 5/5 L, 4/5 R  PF 5/5 L, 4/5 L    Sensation - Light touch/vibration reduced R side  DTR's - Uppers Left 2+, R side unable to assess d/t positioning, Ankle 2+ R, 0 L. Patellars 0 b/l. Equivocal plantar response b/l.  Coordination - Finger to Nose intact left side, Heel to [place] intact left side. Cannot perform R side d/t weakness.  Gait and station - Deferred.      LABORATORY:  CBC                       13.8   7.31  )-----------( 328      ( 12 Oct 2023 05:32 )             43.0     Chem 10-12    136  |  99  |  22  ----------------------------<  90  4.6   |  24  |  0.79    Ca    9.6      12 Oct 2023 05:32  Phos  3.8     10-12  Mg     2.10     10-12      LFTs   Coagulopathy   Lipid Panel 10-08 Chol 208<H> LDL -- HDL 71 Trig 65  A1c   Cardiac enzymes     U/A Urinalysis Basic - ( 12 Oct 2023 05:32 )    Color: x / Appearance: x / SG: x / pH: x  Gluc: 90 mg/dL / Ketone: x  / Bili: x / Urobili: x   Blood: x / Protein: x / Nitrite: x   Leuk Esterase: x / RBC: x / WBC x   Sq Epi: x / Non Sq Epi: x / Bacteria: x    Radiology (XR, CT, MR, U/S, TTE/NYASIA):    CT HEAD: No acute intracranial bleeding. Chronic large left MCA territory   infarction.    CTA BRAIN: Patent intracranial circulation. No flow-limiting stenosis or   occlusion.    CTA NECK: Patent cervical vasculature. No flow limiting stenosis or   occlusion.    No change in large heterogeneous thyroid mass with intrathoracic   extension into the right upper lobe with compression of the upper lobe   bronchus and associated atelectasis.

## 2023-10-12 NOTE — PROVIDER CONTACT NOTE (OTHER) - ASSESSMENT
Patient /98. Patient asymptomatic. Afib on tele. RN administered 1800 dose of coreg and clonidine as per orders.
Patient BP 93/46. Afib on tele. Patient asymptomatic.  Patient received coreg and clonidine about 1 hour ago.
VS in flowsheet. Pt denies discomfort
/78, all other vitals stable. Patient denies pain at this time. Hydralazine given now as ordered.
No acute distress noted. Patient denies chest pain at this time. Vitals as noted in flowsheet.
minutes. Pt's son, Salomon, was contacted and notified of ETA. Son remains at pt's home. RN notified.
Patient /110. Asymptomatic. afib on tele.  RN administered coreg 12.5mg PO. Due for lasix 20mg PO.
/98, all other vitals stable. Patient denies pain at this time. Coreg and clonidine given now as ordered.

## 2023-10-12 NOTE — DISCHARGE NOTE NURSING/CASE MANAGEMENT/SOCIAL WORK - PATIENT PORTAL LINK FT
You can access the FollowMyHealth Patient Portal offered by [facility] by registering at the following website: [email or web address]. By joining [email or web address]’s FollowMyHealth portal, you will also be able to view your health information using other applications (apps) compatible with our system.

## 2023-10-13 ENCOUNTER — APPOINTMENT (OUTPATIENT)
Dept: HOME HEALTH SERVICES | Facility: HOME HEALTH | Age: 83
End: 2023-10-13

## 2023-10-13 VITALS
DIASTOLIC BLOOD PRESSURE: 66 MMHG | SYSTOLIC BLOOD PRESSURE: 100 MMHG | HEART RATE: 77 BPM | RESPIRATION RATE: 16 BRPM | OXYGEN SATURATION: 97 % | TEMPERATURE: 98.9 F

## 2023-10-17 ENCOUNTER — APPOINTMENT (OUTPATIENT)
Dept: HOME HEALTH SERVICES | Facility: HOME HEALTH | Age: 83
End: 2023-10-17
Payer: MEDICARE

## 2023-10-17 VITALS
BODY MASS INDEX: 28.45 KG/M2 | WEIGHT: 177 LBS | RESPIRATION RATE: 16 BRPM | SYSTOLIC BLOOD PRESSURE: 120 MMHG | HEIGHT: 66 IN | OXYGEN SATURATION: 97 % | TEMPERATURE: 96.8 F | DIASTOLIC BLOOD PRESSURE: 70 MMHG | HEART RATE: 80 BPM

## 2023-10-17 DIAGNOSIS — R53.1 WEAKNESS: ICD-10-CM

## 2023-10-17 DIAGNOSIS — I48.91 UNSPECIFIED ATRIAL FIBRILLATION: ICD-10-CM

## 2023-10-17 DIAGNOSIS — Z23 ENCOUNTER FOR IMMUNIZATION: ICD-10-CM

## 2023-10-17 PROCEDURE — G0008: CPT

## 2023-10-17 PROCEDURE — 99349 HOME/RES VST EST MOD MDM 40: CPT | Mod: 25

## 2023-10-17 PROCEDURE — 90662 IIV NO PRSV INCREASED AG IM: CPT

## 2023-10-17 RX ORDER — HYDRALAZINE HYDROCHLORIDE 25 MG/1
25 TABLET ORAL
Qty: 90 | Refills: 0 | Status: COMPLETED | COMMUNITY
Start: 2023-07-15

## 2023-11-07 ENCOUNTER — APPOINTMENT (OUTPATIENT)
Dept: ENDOCRINOLOGY | Facility: CLINIC | Age: 83
End: 2023-11-07
Payer: MEDICARE

## 2023-11-07 VITALS — DIASTOLIC BLOOD PRESSURE: 70 MMHG | SYSTOLIC BLOOD PRESSURE: 159 MMHG | HEART RATE: 74 BPM | OXYGEN SATURATION: 95 %

## 2023-11-07 PROCEDURE — 99204 OFFICE O/P NEW MOD 45 MIN: CPT

## 2023-11-14 RX ORDER — ATORVASTATIN CALCIUM 40 MG/1
40 TABLET, FILM COATED ORAL DAILY
Qty: 1 | Refills: 1 | Status: ACTIVE | COMMUNITY
Start: 2023-10-14

## 2023-11-14 RX ORDER — MULTIVITAMIN/IRON/FOLIC ACID 18MG-0.4MG
TABLET ORAL DAILY
Refills: 0 | Status: ACTIVE | COMMUNITY
Start: 2023-04-18

## 2023-11-14 RX ORDER — APIXABAN 5 MG/1
5 TABLET, FILM COATED ORAL TWICE DAILY
Refills: 0 | Status: ACTIVE | COMMUNITY

## 2023-11-14 RX ORDER — CLONIDINE HYDROCHLORIDE 0.2 MG/1
0.2 TABLET ORAL
Qty: 180 | Refills: 0 | Status: ACTIVE | COMMUNITY
Start: 2023-08-08

## 2023-11-17 ENCOUNTER — NON-APPOINTMENT (OUTPATIENT)
Age: 83
End: 2023-11-17

## 2023-11-21 ENCOUNTER — LABORATORY RESULT (OUTPATIENT)
Age: 83
End: 2023-11-21

## 2023-11-21 ENCOUNTER — APPOINTMENT (OUTPATIENT)
Dept: ULTRASOUND IMAGING | Facility: CLINIC | Age: 83
End: 2023-11-21
Payer: MEDICARE

## 2023-11-21 ENCOUNTER — APPOINTMENT (OUTPATIENT)
Dept: CT IMAGING | Facility: CLINIC | Age: 83
End: 2023-11-21
Payer: MEDICARE

## 2023-11-21 PROCEDURE — 76536 US EXAM OF HEAD AND NECK: CPT

## 2023-11-21 PROCEDURE — 74178 CT ABD&PLV WO CNTR FLWD CNTR: CPT

## 2023-11-22 ENCOUNTER — APPOINTMENT (OUTPATIENT)
Dept: PULMONOLOGY | Facility: CLINIC | Age: 83
End: 2023-11-22
Payer: MEDICARE

## 2023-11-22 VITALS
HEART RATE: 74 BPM | HEIGHT: 66 IN | SYSTOLIC BLOOD PRESSURE: 140 MMHG | OXYGEN SATURATION: 94 % | DIASTOLIC BLOOD PRESSURE: 70 MMHG | WEIGHT: 177 LBS | BODY MASS INDEX: 28.45 KG/M2

## 2023-11-22 DIAGNOSIS — R06.02 SHORTNESS OF BREATH: ICD-10-CM

## 2023-11-22 PROCEDURE — 94060 EVALUATION OF WHEEZING: CPT

## 2023-11-22 PROCEDURE — 99203 OFFICE O/P NEW LOW 30 MIN: CPT | Mod: 25

## 2023-11-24 ENCOUNTER — NON-APPOINTMENT (OUTPATIENT)
Age: 83
End: 2023-11-24

## 2023-11-24 LAB
ALDOSTERONE SERUM: 8.4 NG/DL
APO LP(A) SERPL-MCNC: 122.4 NMOL/L
CHOLEST SERPL-MCNC: 105 MG/DL
CORTIS SERPL-MCNC: 12 UG/DL
HDLC SERPL-MCNC: 47 MG/DL
LDLC SERPL CALC-MCNC: 44 MG/DL
METANEPHRINE, PL: <25 PG/ML
NONHDLC SERPL-MCNC: 58 MG/DL
NORMETANEPHRINE, PL: 183.3 PG/ML
RENIN ACTIVITY, PLASMA: 0.39 NG/ML/HR
T3 SERPL-MCNC: 183 NG/DL
T4 FREE SERPL-MCNC: 2 NG/DL
TRIGL SERPL-MCNC: 65 MG/DL
TSH SERPL-ACNC: 0.01 UIU/ML
TSH SERPL-ACNC: <0.01 UIU/ML

## 2023-11-30 ENCOUNTER — APPOINTMENT (OUTPATIENT)
Dept: OTOLARYNGOLOGY | Facility: CLINIC | Age: 83
End: 2023-11-30
Payer: MEDICARE

## 2023-11-30 VITALS
BODY MASS INDEX: 28.45 KG/M2 | HEIGHT: 66 IN | WEIGHT: 177 LBS | SYSTOLIC BLOOD PRESSURE: 195 MMHG | DIASTOLIC BLOOD PRESSURE: 99 MMHG

## 2023-11-30 PROCEDURE — 99204 OFFICE O/P NEW MOD 45 MIN: CPT | Mod: 25

## 2023-11-30 PROCEDURE — 31575 DIAGNOSTIC LARYNGOSCOPY: CPT

## 2023-11-30 RX ORDER — DOCUSATE SODIUM 100 MG/1
100 CAPSULE ORAL TWICE DAILY
Qty: 60 | Refills: 1 | Status: COMPLETED | COMMUNITY
Start: 2023-03-08 | End: 2023-11-30

## 2023-11-30 RX ORDER — MULTIVIT-MIN/IRON/FOLIC ACID/K 18-600-40
50 MCG CAPSULE ORAL DAILY
Refills: 0 | Status: COMPLETED | COMMUNITY
Start: 2023-04-18 | End: 2023-11-30

## 2023-12-12 ENCOUNTER — APPOINTMENT (OUTPATIENT)
Dept: ENDOCRINOLOGY | Facility: CLINIC | Age: 83
End: 2023-12-12
Payer: MEDICARE

## 2023-12-12 VITALS
DIASTOLIC BLOOD PRESSURE: 83 MMHG | SYSTOLIC BLOOD PRESSURE: 191 MMHG | OXYGEN SATURATION: 94 % | HEIGHT: 66 IN | HEART RATE: 75 BPM

## 2023-12-12 PROCEDURE — 99215 OFFICE O/P EST HI 40 MIN: CPT

## 2023-12-13 ENCOUNTER — APPOINTMENT (OUTPATIENT)
Dept: HOME HEALTH SERVICES | Facility: HOME HEALTH | Age: 83
End: 2023-12-13

## 2023-12-14 ENCOUNTER — NON-APPOINTMENT (OUTPATIENT)
Age: 83
End: 2023-12-14

## 2023-12-14 LAB
ALBUMIN SERPL ELPH-MCNC: 4.4 G/DL
ALP BLD-CCNC: 127 U/L
ALT SERPL-CCNC: 23 U/L
ANION GAP SERPL CALC-SCNC: 14 MMOL/L
AST SERPL-CCNC: 31 U/L
BILIRUB SERPL-MCNC: 0.4 MG/DL
BUN SERPL-MCNC: 14 MG/DL
CALCIUM SERPL-MCNC: 9.5 MG/DL
CHLORIDE SERPL-SCNC: 104 MMOL/L
CO2 SERPL-SCNC: 24 MMOL/L
CREAT SERPL-MCNC: 0.62 MG/DL
EGFR: 88 ML/MIN/1.73M2
GLUCOSE SERPL-MCNC: 83 MG/DL
POTASSIUM SERPL-SCNC: 4.3 MMOL/L
PROT SERPL-MCNC: 7.3 G/DL
SODIUM SERPL-SCNC: 142 MMOL/L
T3 SERPL-MCNC: 160 NG/DL
T4 FREE SERPL-MCNC: 1.8 NG/DL
TSH SERPL-ACNC: 0.01 UIU/ML

## 2023-12-21 ENCOUNTER — APPOINTMENT (OUTPATIENT)
Dept: MRI IMAGING | Facility: CLINIC | Age: 83
End: 2023-12-21
Payer: MEDICARE

## 2023-12-21 PROCEDURE — 74183 MRI ABD W/O CNTR FLWD CNTR: CPT

## 2023-12-21 PROCEDURE — A9585: CPT | Mod: JW

## 2023-12-22 LAB
CREAT 24H UR-MCNC: 0.8 G/24 H
CREAT ?TM UR-MCNC: 67 MG/DL
PROT ?TM UR-MCNC: 24 HR
SPECIMEN VOL 24H UR: 1150 ML

## 2024-01-04 ENCOUNTER — NON-APPOINTMENT (OUTPATIENT)
Age: 84
End: 2024-01-04

## 2024-01-04 LAB
CORTIS 24H UR-MCNC: 24 H
CORTIS 24H UR-MRATE: 17 MCG/24 H
SPECIMEN VOL 24H UR: 1150 ML

## 2024-02-06 ENCOUNTER — APPOINTMENT (OUTPATIENT)
Dept: ENDOCRINOLOGY | Facility: CLINIC | Age: 84
End: 2024-02-06
Payer: MEDICARE

## 2024-02-06 VITALS
OXYGEN SATURATION: 96 % | SYSTOLIC BLOOD PRESSURE: 144 MMHG | HEIGHT: 66 IN | DIASTOLIC BLOOD PRESSURE: 72 MMHG | HEART RATE: 58 BPM

## 2024-02-06 PROCEDURE — G2211 COMPLEX E/M VISIT ADD ON: CPT

## 2024-02-06 PROCEDURE — 99215 OFFICE O/P EST HI 40 MIN: CPT

## 2024-02-06 RX ORDER — VALSARTAN 160 MG/1
160 TABLET, COATED ORAL
Refills: 0 | Status: DISCONTINUED | COMMUNITY
End: 2024-02-06

## 2024-02-06 RX ORDER — VALSARTAN 160 MG/1
160 TABLET, COATED ORAL DAILY
Qty: 90 | Refills: 1 | Status: ACTIVE | COMMUNITY
Start: 2024-02-06

## 2024-02-06 NOTE — ASSESSMENT
[FreeTextEntry1] : 83F Thyroid mass partial resection, Afib - Eliquis, CVA w/ Rt hemiparesis, HTN, TR w/ PulmHTN, p/w worsening Rt hemiparesis c/b HTN emergency, enlarging thyroid mass - suspect goiter. Was seen in the hospital for hemiparesis in early Oct 2023.  Referred to endocrine for adrenal incidentaloma and substernal thyroid goiter.  Thyroid goiter Hyperthyroidism Having MORRISSEY. Labs and imaging as above. -Check TSH, FT4, TT3, CMP prior to next visit -Nuclear medicine uptake and scan tomorrow -Already on beta-blockade with good heart rate control -Following with head and neck surgery, next visit in 1 year  Adrenal adenoma CT adrenal protocol and MRI as above. Dex suppression test abnormal but 24 hour urine cortisol normal. Has evidence of hyperaldo. Metanephrines negative. -Continue spironolactone 50 mg daily for presumed aldosteronism. Patient declines aldosterone suppression testing at this time. -Late night saliva cortisol x2 -Had normal DXA at R Oct 2023. Images reviewed by me.  HLD On atorvastatin 40mg daily. Recent lipids reviewed.  RTC 1 month  This patient's condition is high risk.  Best Heath DO.

## 2024-02-06 NOTE — REASON FOR VISIT
[Follow - Up] : a follow-up visit [Hyperthyroidism] : hyperthyroidism [Adrenal Evaluation/Adrenal Disorder] : adrenal evaluation/adrenal disorder [Thyroid nodule/ MNG] : thyroid nodule/ MNG

## 2024-02-06 NOTE — HISTORY OF PRESENT ILLNESS
[FreeTextEntry1] : 83F Thyroid mass partial resection, Afib - Eliquis, CVA w/ Rt hemiparesis, HTN, TR w/ PulmHTN, p/w worsening Rt hemiparesis c/b HTN emergency, enlarging thyroid mass - suspect goiter. Was seen in the hospital for hemiparesis in early Oct 2023.  Referred to endocrine for adrenal incidentaloma and substernal thyroid goiter.  TSH in the hospital normal.  Twenty-thirty years ago had goiter with partial thyroidectomy. Path was benign per patient. No need for f/u per patient.  Had CT that showed: CHEST WALL AND LOWER NECK: Prominent right paratracheal/substernal extension of thyroid goiter, as seen on prior CT scan. ADRENALS: 2.2 cm left adrenal nodule corresponding with previously present adenoma, increased size, previously 1.4 cm.  CTA neck showed: Redemonstrated large heterogeneous thyroid mass with calcification with intrathoracic extension into the right upper lobe with compression of the upper lobe bronchus and associated atelectasis, unchanged. Left apical discoid atelectasis and/or scarring.  Thyroid ultrasound performed on November 27, 2023 showed bilateral thyroid nodules including dominant right thyroid midpole nodule, 2.6 cm, isoechoic, TI-RADS 3.  Patient never had this biopsied previously.  Patient saw otolaryngologist Dr. Marion in late November 2023.  Dr. Marion performed fiberoptic laryngoscopy.  Does not believe the patient's shortness of breath is related to the goiter, and recommends surveillance for now.  Plans to repeat assessment in 1 year.    TFTs show overt hyperthyroidism, though most recent FT4 upper normal.  Graves' antibodies negative, TPO antibody positive.  Patient scheduled for nuclear medicine uptake and scan of the thyroid tomorrow.  If the patient has hot nodule, we will revisit otolaryngology to discuss surgical options.  Can also consider methimazole only, though this will not be curative for hot nodule.  If no hot nodule, we will proceed with biopsy of dominant thyroid nodule.  If patient has antibody negative Graves', will treat with methimazole.  Thyroiditis is also on the differential still. No compressive symptoms. Is getting SOB with mild exertion. Denies prior radiation therapy/exposure. Had partial thyroidectomy. No prior tx for hyperthyroidism or hypothyroidism, including medication or PAVON. Weight stable.  Has prediabetes though in remission without meds.   CT abdomen and pelvis with and without IV contrast following adrenal protocol shows 2.2 cm left adrenal nodule, mildly increased in size compared with 2008 (1.6 cm). The nodule is now heterogeneous and contains a rounded mildly hyperdense and nonenhancing component.  Per radiologist, this likely represents an adenoma that bled.  MRI showed stable unilateral adrenal nodule with evidence of hemorrhage. No clear suggestion of metastatic disease.  Renin suppressed. Aldosterone 8.4. Would proceed with 24-hour urine aldosterone suppression test using oral sodium loading, but patient unlikely to pursue surgery for primary aldosteronism.  Wishes to continue treatment with spironolactone only.  On spironolactone 50 mg daily.  To continue other blood pressure medications.  Plasma metanephrines were negative.  ACTH unable to be tested as specimen not suitable for testing. A.m. cortisol 12.0 (independent of Dex suppression test).  Performed 1 mg Dex suppression test.  Cortisol a.m. 2.3 with therapeutic dex level.  Cortisol inadequately suppressed with adequate Dex level. Proceeded with 24-hour urine cortisol collection for confirmation. 24 hour urine cortisol normal. Will check late night saliva cortisol x2.  Repeating labs today, including TFTs, CMP.  Current BP meds: did not bring updated list today. Is taking spironolactone 50mg daily. BP at home has been good.  FHx: Uncle with goiter and thyroid surgery. No thyroid cancer in family. No other endocrine problems in the family.

## 2024-02-06 NOTE — PHYSICAL EXAM
[Alert] : alert [Well Nourished] : well nourished [No Acute Distress] : no acute distress [No Proptosis] : no proptosis [No Lid Lag] : no lid lag [Supple] : the neck was supple [No Respiratory Distress] : no respiratory distress [No Accessory Muscle Use] : no accessory muscle use [Normal Rate and Effort] : normal respiratory rate and effort [Normal Rate] : heart rate was normal [Not Tender] : non-tender [Soft] : abdomen soft [No Spinal Tenderness] : no spinal tenderness [Spine Straight] : spine straight [No Involuntary Movements] : no involuntary movements were seen [Cranial Nerves Intact] : cranial nerves 2-12 were intact [Oriented x3] : oriented to person, place, and time [Normal Affect] : the affect was normal [Normal Mood] : the mood was normal [No Edema] : no peripheral edema [Kyphosis] : no kyphosis present [de-identified] : ANNABELLA appreciated. [de-identified] : Irregular rhythm.

## 2024-02-07 ENCOUNTER — APPOINTMENT (OUTPATIENT)
Dept: NUCLEAR MEDICINE | Facility: HOSPITAL | Age: 84
End: 2024-02-07
Payer: MEDICARE

## 2024-02-07 ENCOUNTER — RESULT REVIEW (OUTPATIENT)
Age: 84
End: 2024-02-07

## 2024-02-07 ENCOUNTER — NON-APPOINTMENT (OUTPATIENT)
Age: 84
End: 2024-02-07

## 2024-02-07 ENCOUNTER — OUTPATIENT (OUTPATIENT)
Dept: OUTPATIENT SERVICES | Facility: HOSPITAL | Age: 84
LOS: 1 days | End: 2024-02-07
Payer: COMMERCIAL

## 2024-02-07 DIAGNOSIS — E07.9 DISORDER OF THYROID, UNSPECIFIED: Chronic | ICD-10-CM

## 2024-02-07 DIAGNOSIS — Z90.710 ACQUIRED ABSENCE OF BOTH CERVIX AND UTERUS: Chronic | ICD-10-CM

## 2024-02-07 DIAGNOSIS — R79.89 OTHER SPECIFIED ABNORMAL FINDINGS OF BLOOD CHEMISTRY: ICD-10-CM

## 2024-02-07 DIAGNOSIS — E05.90 THYROTOXICOSIS, UNSPECIFIED WITHOUT THYROTOXIC CRISIS OR STORM: ICD-10-CM

## 2024-02-08 ENCOUNTER — APPOINTMENT (OUTPATIENT)
Dept: NUCLEAR MEDICINE | Facility: HOSPITAL | Age: 84
End: 2024-02-08

## 2024-02-08 PROCEDURE — 78014 THYROID IMAGING W/BLOOD FLOW: CPT | Mod: 26

## 2024-02-08 PROCEDURE — A9516: CPT

## 2024-02-08 PROCEDURE — 78014 THYROID IMAGING W/BLOOD FLOW: CPT

## 2024-03-01 ENCOUNTER — NON-APPOINTMENT (OUTPATIENT)
Age: 84
End: 2024-03-01

## 2024-03-05 ENCOUNTER — LABORATORY RESULT (OUTPATIENT)
Age: 84
End: 2024-03-05

## 2024-03-05 ENCOUNTER — APPOINTMENT (OUTPATIENT)
Dept: ENDOCRINOLOGY | Facility: CLINIC | Age: 84
End: 2024-03-05
Payer: MEDICARE

## 2024-03-05 ENCOUNTER — NON-APPOINTMENT (OUTPATIENT)
Age: 84
End: 2024-03-05

## 2024-03-05 VITALS
TEMPERATURE: 97.8 F | SYSTOLIC BLOOD PRESSURE: 134 MMHG | WEIGHT: 173 LBS | DIASTOLIC BLOOD PRESSURE: 72 MMHG | RESPIRATION RATE: 18 BRPM | BODY MASS INDEX: 27.8 KG/M2 | HEIGHT: 66 IN | OXYGEN SATURATION: 97 % | HEART RATE: 57 BPM

## 2024-03-05 DIAGNOSIS — R73.03 PREDIABETES.: ICD-10-CM

## 2024-03-05 DIAGNOSIS — E78.5 HYPERLIPIDEMIA, UNSPECIFIED: ICD-10-CM

## 2024-03-05 PROCEDURE — 99215 OFFICE O/P EST HI 40 MIN: CPT

## 2024-03-05 PROCEDURE — G2211 COMPLEX E/M VISIT ADD ON: CPT

## 2024-03-05 RX ORDER — SPIRONOLACTONE 50 MG/1
50 TABLET ORAL
Qty: 3 | Refills: 1 | Status: ACTIVE | COMMUNITY
Start: 2023-12-12 | End: 1900-01-01

## 2024-03-05 NOTE — PHYSICAL EXAM
[Well Nourished] : well nourished [Alert] : alert [No Proptosis] : no proptosis [No Acute Distress] : no acute distress [No Respiratory Distress] : no respiratory distress [No Accessory Muscle Use] : no accessory muscle use [Normal Rate and Effort] : normal respiratory rate and effort [Cranial Nerves Intact] : cranial nerves 2-12 were intact [No Involuntary Movements] : no involuntary movements were seen [No Motor Deficits] : the motor exam was normal [Oriented x3] : oriented to person, place, and time [Normal Affect] : the affect was normal [Normal Mood] : the mood was normal

## 2024-03-05 NOTE — ASSESSMENT
[FreeTextEntry1] : 83F Thyroid mass partial resection, Afib - Eliquis, CVA w/ Rt hemiparesis, HTN, TR w/ PulmHTN, p/w worsening Rt hemiparesis c/b HTN emergency, enlarging thyroid mass - suspect goiter. Was seen in the hospital for hemiparesis in early Oct 2023.  Referred to endocrine for adrenal incidentaloma and substernal thyroid goiter.  Thyroid goiter Hyperthyroidism, likely thyroiditis, now euthyroid Having MORRISSEY. Labs and imaging as above. -Check TSH, FT4, TT3, CMP prior to next visit in 6 weeks -On beta-blockade with good heart rate control -Following with head and neck surgery, next visit in 1 year -Patient wants to discuss thyroid nodule biopsy at a later date, not interested now.  Adrenal adenoma CT adrenal protocol and MRI as above. Dex suppression test abnormal but 24 hour urine cortisol normal. Has evidence of hyperaldo. Metanephrines negative. Late night saliva cortisol x2 negative. -Continue spironolactone 50 mg daily for presumed aldosteronism. Patient declines aldosterone suppression testing at this time. -Had normal DXA at R Oct 2023. Images reviewed by me.  HLD On atorvastatin 40mg daily. Recent lipids reviewed.  RTC 6 weeks  This patient's condition is high risk.  Best Heath DO.

## 2024-03-05 NOTE — REASON FOR VISIT
[Follow - Up] : a follow-up visit [Adrenal Evaluation/Adrenal Disorder] : adrenal evaluation/adrenal disorder [Hyperthyroidism] : hyperthyroidism [Family Member] : family member [Thyroid nodule/ MNG] : thyroid nodule/ MNG [Formal Caregiver] : formal caregiver

## 2024-03-05 NOTE — HISTORY OF PRESENT ILLNESS
[FreeTextEntry1] : 83F Thyroid mass partial resection, Afib - Eliquis, CVA w/ Rt hemiparesis, HTN, TR w/ PulmHTN, p/w worsening Rt hemiparesis c/b HTN emergency, enlarging thyroid mass - suspect goiter. Was seen in the hospital for hemiparesis in early Oct 2023.  Referred to endocrine for adrenal incidentaloma and substernal thyroid goiter.  TSH in the hospital normal.  Twenty-thirty years ago had goiter with partial thyroidectomy. Path was benign per patient. No need for f/u per patient.  Had CT that showed: CHEST WALL AND LOWER NECK: Prominent right paratracheal/substernal extension of thyroid goiter, as seen on prior CT scan. ADRENALS: 2.2 cm left adrenal nodule corresponding with previously present adenoma, increased size, previously 1.4 cm.  CTA neck showed: Redemonstrated large heterogeneous thyroid mass with calcification with intrathoracic extension into the right upper lobe with compression of the upper lobe bronchus and associated atelectasis, unchanged. Left apical discoid atelectasis and/or scarring.  Thyroid ultrasound performed on November 27, 2023 showed bilateral thyroid nodules including dominant right thyroid midpole nodule, 2.6 cm, isoechoic, TI-RADS 3.  Patient never had this biopsied previously.  Patient saw otolaryngologist Dr. Marion in late November 2023.  Dr. Marion performed fiberoptic laryngoscopy.  Does not believe the patient's shortness of breath is related to the goiter, and recommends surveillance for now.  Plans to repeat assessment in 1 year.    TFTs showed overt hyperthyroidism. Graves' antibodies negative, TPO antibody positive.  Abnormal thyroid uptake and scan. Findings are suggestive of thyroiditis with 0.6% 24-hour neck uptake, markedly decreased. Attention to followup. TFTs now normal (Feb 2024), likely with thyroiditis. Can plan for thyroid nodule biopsy as NM uptake and scan showed no hot nodule. No need for methimazole, PAVON or surgery. Monitor TFTs (6 weeks) to ensure does not become hypothyroid. Stimulus for hyperthyroidism likely iodine-induced from imaging.  CBC showed mild anemia and slightly elevated lymph # and mono #. To discuss with PCP.  Creatinine bumped from 0.92 in late Dec 2023 to 1.43. Patient without obvious cause though she is now on spironolactone and lasix together. Feeling well, no urinary issues. Encouraged PO hydration and repeat labs on Tuesday at our visit. If patient feels at all unwell, will be taken to ED.  No compressive symptoms. Is getting SOB with mild exertion. Denies prior radiation therapy/exposure. Had partial thyroidectomy. No prior tx for hyperthyroidism or hypothyroidism, including medication or PAVON. Weight stable.  Has prediabetes though in remission without meds.   CT abdomen and pelvis with and without IV contrast following adrenal protocol shows 2.2 cm left adrenal nodule, mildly increased in size compared with 2008 (1.6 cm). The nodule is now heterogeneous and contains a rounded mildly hyperdense and nonenhancing component.  Per radiologist, this likely represents an adenoma that bled.  MRI showed stable unilateral adrenal nodule with evidence of hemorrhage. No clear suggestion of metastatic disease.  Renin suppressed. Aldosterone 8.4. Would proceed with 24-hour urine aldosterone suppression test using oral sodium loading, but patient unlikely to pursue surgery for primary aldosteronism.  Wishes to continue treatment with spironolactone only.  On spironolactone 50 mg daily.  To continue other blood pressure medications.  Plasma metanephrines were negative.  ACTH unable to be tested as specimen not suitable for testing. A.m. cortisol 12.0 (independent of Dex suppression test).  Performed 1 mg Dex suppression test.  Cortisol a.m. 2.3 with therapeutic dex level.  Cortisol inadequately suppressed with adequate Dex level. Proceeded with 24-hour urine cortisol collection for confirmation. 24 hour urine cortisol normal. Late night saliva cortisol x2 negative.  Repeating labs today.  Current BP meds: did not bring updated list today. Is taking spironolactone 50mg daily. BP at home has been good.  FHx: Uncle with goiter and thyroid surgery. No thyroid cancer in family. No other endocrine problems in the family.

## 2024-03-06 LAB
ALBUMIN SERPL ELPH-MCNC: 4.3 G/DL
ALDOSTERONE SERUM: 16.6 NG/DL
ALP BLD-CCNC: 112 U/L
ALT SERPL-CCNC: 19 U/L
ANION GAP SERPL CALC-SCNC: 14 MMOL/L
APPEARANCE: CLEAR
AST SERPL-CCNC: 25 U/L
BASOPHILS # BLD AUTO: 0.05 K/UL
BASOPHILS NFR BLD AUTO: 0.6 %
BILIRUB SERPL-MCNC: 0.4 MG/DL
BILIRUBIN URINE: NEGATIVE
BLOOD URINE: NEGATIVE
BUN SERPL-MCNC: 29 MG/DL
CALCIUM SERPL-MCNC: 10 MG/DL
CHLORIDE SERPL-SCNC: 101 MMOL/L
CO2 SERPL-SCNC: 22 MMOL/L
COLOR: YELLOW
CREAT SERPL-MCNC: 1.32 MG/DL
CREAT SPEC-SCNC: 48 MG/DL
CREAT/PROT UR: 0.2 RATIO
CYSTATIN C SERPL-MCNC: 1.93 MG/L
EGFR: 40 ML/MIN/1.73M2
EOSINOPHIL # BLD AUTO: 0.24 K/UL
EOSINOPHIL NFR BLD AUTO: 2.8 %
FERRITIN SERPL-MCNC: 94 NG/ML
GFR/BSA.PRED SERPLBLD CYS-BASED-ARV: 28 ML/MIN/1.73M2
GLUCOSE QUALITATIVE U: NEGATIVE MG/DL
GLUCOSE SERPL-MCNC: 82 MG/DL
HCT VFR BLD CALC: 35.8 %
HGB BLD-MCNC: 11.4 G/DL
IMM GRANULOCYTES NFR BLD AUTO: 0.2 %
IRON SATN MFR SERPL: 21 %
IRON SERPL-MCNC: 80 UG/DL
KETONES URINE: NEGATIVE MG/DL
LEUKOCYTE ESTERASE URINE: ABNORMAL
LYMPHOCYTES # BLD AUTO: 2.91 K/UL
LYMPHOCYTES NFR BLD AUTO: 34.5 %
MAN DIFF?: NORMAL
MCHC RBC-ENTMCNC: 27.5 PG
MCHC RBC-ENTMCNC: 31.8 GM/DL
MCV RBC AUTO: 86.5 FL
MONOCYTES # BLD AUTO: 0.97 K/UL
MONOCYTES NFR BLD AUTO: 11.5 %
NEUTROPHILS # BLD AUTO: 4.25 K/UL
NEUTROPHILS NFR BLD AUTO: 50.4 %
NITRITE URINE: NEGATIVE
PH URINE: 6.5
PLATELET # BLD AUTO: 288 K/UL
POTASSIUM SERPL-SCNC: 4.9 MMOL/L
PROT SERPL-MCNC: 8 G/DL
PROT UR-MCNC: 11 MG/DL
PROTEIN URINE: NEGATIVE MG/DL
RBC # BLD: 4.14 M/UL
RBC # FLD: 15.2 %
SODIUM SERPL-SCNC: 137 MMOL/L
SPECIFIC GRAVITY URINE: 1.01
TIBC SERPL-MCNC: 387 UG/DL
TRANSFERRIN SERPL-MCNC: 325 MG/DL
UIBC SERPL-MCNC: 307 UG/DL
UROBILINOGEN URINE: 0.2 MG/DL
WBC # FLD AUTO: 8.44 K/UL

## 2024-03-12 LAB — RENIN ACTIVITY, PLASMA: 1.31 NG/ML/HR

## 2024-03-20 ENCOUNTER — APPOINTMENT (OUTPATIENT)
Dept: NEPHROLOGY | Facility: CLINIC | Age: 84
End: 2024-03-20
Payer: MEDICARE

## 2024-03-20 ENCOUNTER — OUTPATIENT (OUTPATIENT)
Dept: OUTPATIENT SERVICES | Facility: HOSPITAL | Age: 84
LOS: 1 days | End: 2024-03-20

## 2024-03-20 VITALS — SYSTOLIC BLOOD PRESSURE: 142 MMHG | DIASTOLIC BLOOD PRESSURE: 64 MMHG

## 2024-03-20 VITALS
TEMPERATURE: 98.3 F | RESPIRATION RATE: 16 BRPM | DIASTOLIC BLOOD PRESSURE: 67 MMHG | SYSTOLIC BLOOD PRESSURE: 152 MMHG | WEIGHT: 162.38 LBS | HEIGHT: 66 IN | HEART RATE: 68 BPM | OXYGEN SATURATION: 96 % | BODY MASS INDEX: 26.09 KG/M2

## 2024-03-20 DIAGNOSIS — N17.9 ACUTE KIDNEY FAILURE, UNSPECIFIED: ICD-10-CM

## 2024-03-20 DIAGNOSIS — Z90.710 ACQUIRED ABSENCE OF BOTH CERVIX AND UTERUS: Chronic | ICD-10-CM

## 2024-03-20 DIAGNOSIS — E07.9 DISORDER OF THYROID, UNSPECIFIED: Chronic | ICD-10-CM

## 2024-03-20 PROCEDURE — 99204 OFFICE O/P NEW MOD 45 MIN: CPT | Mod: GC

## 2024-03-20 RX ORDER — DEXAMETHASONE 1 MG/1
1 TABLET ORAL
Qty: 3 | Refills: 0 | Status: DISCONTINUED | COMMUNITY
Start: 2023-11-07 | End: 2024-03-20

## 2024-03-20 RX ORDER — HYDRALAZINE HYDROCHLORIDE 50 MG/1
50 TABLET ORAL
Qty: 180 | Refills: 2 | Status: ACTIVE | COMMUNITY
Start: 2023-10-02

## 2024-03-20 RX ORDER — ALBUTEROL SULFATE 90 UG/1
108 (90 BASE) INHALANT RESPIRATORY (INHALATION)
Qty: 1 | Refills: 3 | Status: DISCONTINUED | COMMUNITY
Start: 2023-11-22 | End: 2024-03-20

## 2024-03-20 NOTE — ASSESSMENT
[FreeTextEntry1] : 1. JACLYN: Pt. with JACLYN, likely hemodynamically mediated in setting of diuretic and ARB therapy use. On review of previous labs on [employer], Scr was WNL at 0.62 on 4/20/23. Scr remained WNL at 0.62 on 12/12/23, increased to 1.43 on labs done on 2/27/24. Last Scr elevated/stable at 1.32 on 3/5/24. UPCR was WNL at 0.2 on 3/5/24. Pt. currently taking Valsartan, Lasix and Spironolactone. Pt. advised to discontinue oral Lasix. Check renal panel today. Pt. advised to undergo kidney sonogram with duplex study. Avoid NSAIDs, RCAs, and other nephrotoxins.   2. HTN: Pt. saw endocrinologist Dr. Best Heath for initial evaluation of adrenal nodule/incidentaloma on 11/7/23. Pt. underwent MR abdomen on 12/21/23 which demonstrated a 1.7 cm left adrenal nodule. No evidence of hydronephrosis, renal masses or perinephric collections but there was some evidence of bilateral renal cortical scarring on MR study. Pt. was taking Carvedilol, Hydralazine, Lasix and Clonidine for BP management. Due to persistently elevated BP, pt. was initiated on Valsartan in late 2023 (as per family). BP however remained elevated, and pt. was initiated on Spironolactone 25 mg once daily in December 2023 by Dr. Heath. Spironolactone dose was subsequently increased to 50 mg once daily for better BP control. As per family, BP improved after increasing dose of oral Spironolactone. Recent home SBPs ranging in the 130s-140s (as per family). Repeat BP reading improved during clinic visit today. Lasix discontinued. Low salt diet advised. Monitor BP on current BP meds.  Follow-up pending review of lab results.

## 2024-03-20 NOTE — HISTORY OF PRESENT ILLNESS
[FreeTextEntry1] : 83-year-old female with longstanding history of HTN (>20 years), (presumed) hyperaldosteronism from adrenal adenoma, hypothyroidism, CVA with RUE weakness and AFib presented to clinic for initial evaluation of elevated Scr/JACLYN. Pt. accompanied with family member (sister-in-law) and formal caregiver.   On review of previous labs on [employer], Scr was WNL at 0.62 on 4/20/23. Scr remained WNL at 0.62 on 12/12/23, increased to 1.43 on labs done on 2/27/24. Last Scr elevated/stable at 1.32 on 3/5/24. UPCR was WNL at 0.2 on 3/5/24. Pt. saw endocrinologist Dr. Best Heath for initial evaluation of adrenal nodule/incidentaloma on 11/7/23. Pt. underwent MR abdomen on 12/21/23 which demonstrated a 1.7 cm left adrenal nodule. No evidence of hydronephrosis, renal masses or perinephric collections but there was some evidence of bilateral renal cortical scarring on MR study. Pt. was taking Carvedilol, Hydralazine, Lasix and Clonidine for BP management. Due to persistently elevated BP, pt. was initiated on Valsartan in late 2023 (as per family). BP however remained elevated, and pt. was initiated on Spironolactone 25 mg once daily in December 2023 by Dr. Heath. Spironolactone dose was subsequently increased to 50 mg once daily for better BP control. As per family, BP improved after increasing dose of oral Spironolactone. Recent home SBPs ranging in the 130s-140s (as per family). Pt. denies history of kidney disease or kidney stones in the past. No history of kidney disease in family. Denies recent use of NSAIDs.    Pt. currently feels well. No fever, CP, SOB, HA, dizziness or LE edema. No urinary complaints.

## 2024-03-20 NOTE — END OF VISIT
[FreeTextEntry3] : I was physically present for the key portions of the evaluation and management (E/M) service provided. I agree with the above history, ROS, physical exam, assessment and plan which I have reviewed and edited where appropriate. I have also reviewed the assessment and management of JACLYN and HTN with the patient and her sister-in-law during clinic visit today.  Pt. with JACLYN, likely hemodynamically mediated in setting of diuretic and ARB therapy use. Last Scr elevated/stable at 1.32 on 3/5/24. Repeat BP reading improved during clinic visit today. Lasix discontinued during clinic visit today. Check labs today (as outlined above). Avoid nephrotoxins. Monitor BP and labs.

## 2024-03-20 NOTE — PHYSICAL EXAM
[General Appearance - Alert] : alert [General Appearance - In No Acute Distress] : in no acute distress [General Appearance - Well Nourished] : well nourished [Sclera] : the sclera and conjunctiva were normal [Outer Ear] : the ears and nose were normal in appearance [Neck Appearance] : the appearance of the neck was normal [Jugular Venous Distention Increased] : there was no jugular-venous distention [Auscultation Breath Sounds / Voice Sounds] : lungs were clear to auscultation bilaterally [Heart Sounds] : normal S1 and S2 [Edema] : there was no peripheral edema [Bowel Sounds] : normal bowel sounds [Abdomen Soft] : soft [Abdomen Tenderness] : non-tender [No CVA Tenderness] : no ~M costovertebral angle tenderness [] : no rash [Oriented To Time, Place, And Person] : oriented to person, place, and time [Heart Sounds Gallop] : no gallops [Respiration, Rhythm And Depth] : normal respiratory rhythm and effort [Heart Sounds Pericardial Friction Rub] : no pericardial rub [Nail Clubbing] : no clubbing  or cyanosis of the fingernails [Affect] : the affect was normal [Mood] : the mood was normal [FreeTextEntry1] : RUE weakness (chronic), ambulating with help of wheel chair

## 2024-03-20 NOTE — REVIEW OF SYSTEMS
[Limb Weakness] : limb weakness [As Noted in HPI] : as noted in HPI [Fever] : no fever [Chills] : no chills [Eye Pain] : no eye pain [Earache] : no earache [Sore Throat] : no sore throat [Palpitations] : no palpitations [Chest Pain] : no chest pain [Lower Ext Edema] : no lower extremity edema [Shortness Of Breath] : no shortness of breath [Abdominal Pain] : no abdominal pain [Cough] : no cough [Vomiting] : no vomiting [Dysuria] : no dysuria [Diarrhea] : no diarrhea [Limb Swelling] : no limb swelling [Skin Lesions] : no skin lesions [Confused] : no confusion [Dizziness] : no dizziness [Anxiety] : no anxiety [Depression] : no depression [Easy Bleeding] : no tendency for easy bleeding [Easy Bruising] : no tendency for easy bruising [de-identified] : GUNJAN clay

## 2024-03-21 ENCOUNTER — NON-APPOINTMENT (OUTPATIENT)
Age: 84
End: 2024-03-21

## 2024-03-21 DIAGNOSIS — I10 ESSENTIAL (PRIMARY) HYPERTENSION: ICD-10-CM

## 2024-03-21 DIAGNOSIS — N17.9 ACUTE KIDNEY FAILURE, UNSPECIFIED: ICD-10-CM

## 2024-03-21 LAB
ALBUMIN SERPL ELPH-MCNC: 4.6 G/DL
ANION GAP SERPL CALC-SCNC: 14 MMOL/L
BUN SERPL-MCNC: 34 MG/DL
CALCIUM SERPL-MCNC: 10.2 MG/DL
CHLORIDE SERPL-SCNC: 101 MMOL/L
CO2 SERPL-SCNC: 22 MMOL/L
CREAT SERPL-MCNC: 1.26 MG/DL
EGFR: 42 ML/MIN/1.73M2
GLUCOSE SERPL-MCNC: 92 MG/DL
PHOSPHATE SERPL-MCNC: 3.8 MG/DL
POTASSIUM SERPL-SCNC: 5 MMOL/L
SODIUM SERPL-SCNC: 137 MMOL/L

## 2024-04-16 ENCOUNTER — APPOINTMENT (OUTPATIENT)
Dept: ENDOCRINOLOGY | Facility: CLINIC | Age: 84
End: 2024-04-16
Payer: MEDICARE

## 2024-04-16 VITALS
HEART RATE: 74 BPM | HEIGHT: 66 IN | DIASTOLIC BLOOD PRESSURE: 69 MMHG | RESPIRATION RATE: 18 BRPM | BODY MASS INDEX: 26.68 KG/M2 | OXYGEN SATURATION: 95 % | TEMPERATURE: 98.5 F | SYSTOLIC BLOOD PRESSURE: 166 MMHG | WEIGHT: 166 LBS

## 2024-04-16 DIAGNOSIS — E26.09 OTHER PRIMARY HYPERALDOSTERONISM: ICD-10-CM

## 2024-04-16 DIAGNOSIS — E04.9 NONTOXIC GOITER, UNSPECIFIED: ICD-10-CM

## 2024-04-16 DIAGNOSIS — I10 ESSENTIAL (PRIMARY) HYPERTENSION: ICD-10-CM

## 2024-04-16 DIAGNOSIS — E27.8 OTHER SPECIFIED DISORDERS OF ADRENAL GLAND: ICD-10-CM

## 2024-04-16 DIAGNOSIS — R79.89 OTHER SPECIFIED ABNORMAL FINDINGS OF BLOOD CHEMISTRY: ICD-10-CM

## 2024-04-16 PROCEDURE — G2211 COMPLEX E/M VISIT ADD ON: CPT

## 2024-04-16 PROCEDURE — 99215 OFFICE O/P EST HI 40 MIN: CPT

## 2024-04-16 NOTE — ASSESSMENT
[FreeTextEntry1] : 83F Thyroid mass partial resection, Afib - Eliquis, CVA w/ Rt hemiparesis, HTN, TR w/ PulmHTN, p/w worsening Rt hemiparesis c/b HTN emergency, enlarging thyroid mass - suspect goiter. Was seen in the hospital for hemiparesis in early Oct 2023.  Referred to endocrine for adrenal incidentaloma and substernal thyroid goiter.  Thyroid goiter Hyperthyroidism, likely thyroiditis, now euthyroid Having MORRISSEY. Labs and imaging as above. -Check TSH, FT4, TT3, CMP now -On beta-blockade with good heart rate control -Following with head and neck surgery, next visit in 1 year -Patient wants to discuss thyroid nodule biopsy at a later date, not interested now.  Adrenal adenoma CT adrenal protocol and MRI as above. Dex suppression test abnormal but 24 hour urine cortisol normal. Has evidence of hyperaldo. Metanephrines negative. Late night saliva cortisol x2 negative. -Continue spironolactone 50 mg daily for presumed aldosteronism. Patient declines aldosterone suppression testing at this time. Will defer further aldactone adjustments to nephro given renal impairment. Renin no longer suppressed. -Had normal DXA at R Oct 2023. Images reviewed by me.  HLD On atorvastatin 40mg daily. Recent lipids reviewed.  RTC 4 months.  This patient's condition is high risk.  Best Heath DO.

## 2024-04-16 NOTE — HISTORY OF PRESENT ILLNESS
[FreeTextEntry1] : 83F Thyroid mass partial resection, Afib - Eliquis, CVA w/ Rt hemiparesis, HTN, TR w/ PulmHTN, p/w worsening Rt hemiparesis c/b HTN emergency, enlarging thyroid mass - suspect goiter. Was seen in the hospital for hemiparesis in early Oct 2023.  Referred to endocrine for adrenal incidentaloma and substernal thyroid goiter.  TSH in the hospital normal.  Twenty-thirty years ago had goiter with partial thyroidectomy. Path was benign per patient. No need for f/u per patient.  Had CT that showed: CHEST WALL AND LOWER NECK: Prominent right paratracheal/substernal extension of thyroid goiter, as seen on prior CT scan. ADRENALS: 2.2 cm left adrenal nodule corresponding with previously present adenoma, increased size, previously 1.4 cm.  CTA neck showed: Redemonstrated large heterogeneous thyroid mass with calcification with intrathoracic extension into the right upper lobe with compression of the upper lobe bronchus and associated atelectasis, unchanged. Left apical discoid atelectasis and/or scarring.  Thyroid ultrasound performed on November 27, 2023 showed bilateral thyroid nodules including dominant right thyroid midpole nodule, 2.6 cm, isoechoic, TI-RADS 3.  Patient never had this biopsied previously.  Patient saw otolaryngologist Dr. Marion in late November 2023.  Dr. Marion performed fiberoptic laryngoscopy.  Does not believe the patient's shortness of breath is related to the goiter, and recommends surveillance for now.  Plans to repeat assessment in 1 year.    TFTs showed overt hyperthyroidism. Graves' antibodies negative, TPO antibody positive.  Abnormal thyroid uptake and scan. Findings are suggestive of thyroiditis with 0.6% 24-hour neck uptake, markedly decreased. Attention to followup. TFTs now normal (Feb 2024), likely with thyroiditis. Can plan for thyroid nodule biopsy as NM uptake and scan showed no hot nodule. No need for methimazole, PAVON or surgery. Monitor TFTs (6 weeks) to ensure does not become hypothyroid. Stimulus for hyperthyroidism likely iodine-induced from imaging.  CBC showed mild anemia and slightly elevated lymph # and mono #. To discuss with PCP.  Creatinine bumped from 0.92 in late Dec 2023 to 1.43. Seeing nephro, cr stable now. Off lasix, BP usually well-controlled at home. Can f/u with nephro.  No compressive symptoms. Is getting SOB with mild exertion. Denies prior radiation therapy/exposure. Had partial thyroidectomy. No prior tx for hyperthyroidism or hypothyroidism, including medication or PAVON. Weight stable.  Has prediabetes though in remission without meds.   CT abdomen and pelvis with and without IV contrast following adrenal protocol shows 2.2 cm left adrenal nodule, mildly increased in size compared with 2008 (1.6 cm). The nodule is now heterogeneous and contains a rounded mildly hyperdense and nonenhancing component.  Per radiologist, this likely represents an adenoma that bled.  MRI showed stable unilateral adrenal nodule with evidence of hemorrhage. No clear suggestion of metastatic disease.  Renin suppressed. Aldosterone 8.4. Would proceed with 24-hour urine aldosterone suppression test using oral sodium loading, but patient unlikely to pursue surgery for primary aldosteronism.  Wishes to continue treatment with spironolactone only.  On spironolactone 50 mg daily.  To continue other blood pressure medications.  Plasma metanephrines were negative.  ACTH unable to be tested as specimen not suitable for testing. A.m. cortisol 12.0 (independent of Dex suppression test).  Performed 1 mg Dex suppression test.  Cortisol a.m. 2.3 with therapeutic dex level.  Cortisol inadequately suppressed with adequate Dex level. Proceeded with 24-hour urine cortisol collection for confirmation. 24 hour urine cortisol normal. Late night saliva cortisol x2 negative.   Current BP meds: clonidine 0.2mg BID, carvedilol 12.5mg BID, hydralazine 100mg daily, sprionolactone 50mg daily, valsartan 160mg daily.  FHx: Uncle with goiter and thyroid surgery. No thyroid cancer in family. No other endocrine problems in the family.

## 2024-04-16 NOTE — PHYSICAL EXAM
[Alert] : alert [Well Nourished] : well nourished [No Acute Distress] : no acute distress [EOMI] : extra ocular movement intact [No Proptosis] : no proptosis [No Respiratory Distress] : no respiratory distress [No Accessory Muscle Use] : no accessory muscle use [Normal Rate and Effort] : normal respiratory rate and effort [Cranial Nerves Intact] : cranial nerves 2-12 were intact [No Motor Deficits] : the motor exam was normal [Oriented x3] : oriented to person, place, and time [Normal Affect] : the affect was normal [Normal Mood] : the mood was normal

## 2024-04-18 LAB
ALBUMIN SERPL ELPH-MCNC: 4.5 G/DL
ALP BLD-CCNC: 111 U/L
ALT SERPL-CCNC: 19 U/L
ANION GAP SERPL CALC-SCNC: 12 MMOL/L
AST SERPL-CCNC: 25 U/L
BILIRUB SERPL-MCNC: 0.4 MG/DL
BUN SERPL-MCNC: 21 MG/DL
CALCIUM SERPL-MCNC: 9.8 MG/DL
CHLORIDE SERPL-SCNC: 104 MMOL/L
CO2 SERPL-SCNC: 22 MMOL/L
CREAT SERPL-MCNC: 1.35 MG/DL
EGFR: 39 ML/MIN/1.73M2
GLUCOSE SERPL-MCNC: 84 MG/DL
POTASSIUM SERPL-SCNC: 5.4 MMOL/L
PROT SERPL-MCNC: 7.5 G/DL
SODIUM SERPL-SCNC: 139 MMOL/L
T3 SERPL-MCNC: 138 NG/DL
T4 FREE SERPL-MCNC: 1.3 NG/DL
TSH SERPL-ACNC: 0.83 UIU/ML

## 2024-04-19 ENCOUNTER — NON-APPOINTMENT (OUTPATIENT)
Age: 84
End: 2024-04-19

## 2024-04-19 DIAGNOSIS — N18.32 CHRONIC KIDNEY DISEASE, STAGE 3B: ICD-10-CM

## 2024-04-19 DIAGNOSIS — N18.30 TYPE 2 DIABETES MELLITUS WITH DIABETIC CHRONIC KIDNEY DISEASE: ICD-10-CM

## 2024-04-19 DIAGNOSIS — E11.22 TYPE 2 DIABETES MELLITUS WITH DIABETIC CHRONIC KIDNEY DISEASE: ICD-10-CM

## 2024-04-25 ENCOUNTER — NON-APPOINTMENT (OUTPATIENT)
Age: 84
End: 2024-04-25

## 2024-06-26 ENCOUNTER — APPOINTMENT (OUTPATIENT)
Dept: NEPHROLOGY | Facility: CLINIC | Age: 84
End: 2024-06-26

## 2024-07-05 ENCOUNTER — APPOINTMENT (OUTPATIENT)
Dept: HOME HEALTH SERVICES | Facility: HOME HEALTH | Age: 84
End: 2024-07-05

## 2024-07-05 ENCOUNTER — NON-APPOINTMENT (OUTPATIENT)
Age: 84
End: 2024-07-05

## 2024-07-22 ENCOUNTER — NON-APPOINTMENT (OUTPATIENT)
Age: 84
End: 2024-07-22

## 2024-07-26 ENCOUNTER — APPOINTMENT (OUTPATIENT)
Dept: HOME HEALTH SERVICES | Facility: HOME HEALTH | Age: 84
End: 2024-07-26

## 2024-08-09 ENCOUNTER — APPOINTMENT (OUTPATIENT)
Dept: PULMONOLOGY | Facility: CLINIC | Age: 84
End: 2024-08-09

## 2024-08-09 PROCEDURE — 99214 OFFICE O/P EST MOD 30 MIN: CPT

## 2024-08-10 NOTE — HISTORY OF PRESENT ILLNESS
[Never] : never [TextBox_4] : 84-year-old female with history of MORRISSEY, PAH and A-fib, presents for follow-up.  The patient was recently admitted to [facility] because of hyperkalemia.  Her medication was adjusted, subsequently discharged home on supplemental oxygen.  Presently she feels "okay" without cough, chest pain, hemoptysis, night sweats or weight loss.  She uses albuterol on an as-needed basis alone. [TextBox_29] : Denies snoring, daytime somnolence, apneic episodes, AM headaches

## 2024-08-10 NOTE — DISCUSSION/SUMMARY
[FreeTextEntry1] : 84-year-old female with MORRISSEY, A-fib and PAH with recent hospitalization for hyperkalemia, discharged home on supplemental oxygen.  At present the patient's pulmonary status appears to be back at baseline, comfortable on room air.  There is no need for continued supplemental oxygen use.  She will contact the [employer] company to have the oxygen returned.  She was given a prescription to discontinue use.  Continued use of albuterol as before was recommended.  She is to follow-up with her PMD as before.  Family member was present throughout.

## 2024-08-10 NOTE — REVIEW OF SYSTEMS
[Cough] : no cough [Sputum] : no sputum [Dyspnea] : dyspnea [Wheezing] : no wheezing [SOB on Exertion] : sob on exertion [Focal Weakness] : focal weakness [Negative] : Endocrine

## 2024-08-10 NOTE — HISTORY OF PRESENT ILLNESS
[Never] : never [TextBox_4] : 84-year-old female with history of MORRISSEY, PAH and A-fib, presents for follow-up.  The patient was recently admitted to [facility] because of hyperkalemia.  Her medication was adjusted, subsequently discharged home on supplemental oxygen.  Presently she feels "okay" without cough, chest pain, hemoptysis, night sweats or weight loss.  She uses albuterol on an as-needed basis alone. [TextBox_29] : Denies snoring, daytime somnolence, apneic episodes, AM headaches

## 2024-08-10 NOTE — DISCUSSION/SUMMARY
[FreeTextEntry1] : 84-year-old female with MORRISSEY, A-fib and PAH with recent hospitalization for hyperkalemia, discharged home on supplemental oxygen.  At present the patient's pulmonary status appears to be back at baseline, comfortable on room air.  There is no need for continued supplemental oxygen use.  She will contact the [employer] company to have the oxygen returned.  She was given a prescription to discontinue use.  Continued use of albuterol as before was recommended.  She is to follow-up with her PMD as before.  Family member was present throughout.

## 2024-08-10 NOTE — PHYSICAL EXAM
[No Acute Distress] : no acute distress [Normal Oropharynx] : normal oropharynx [Normal Appearance] : normal appearance [No Neck Mass] : no neck mass [Murmur ___ / 6] : murmur [unfilled] / 6 [Irregular rate/rhythm] : irregular rate/rhythm [No Rubs] : no rubs [No Resp Distress] : no resp distress [Clear to Auscultation Bilaterally] : clear to auscultation bilaterally [No Abnormalities] : no abnormalities [Benign] : benign [No Clubbing] : no clubbing [No Cyanosis] : no cyanosis [No Edema] : no edema [FROM] : FROM [Normal Color/ Pigmentation] : normal color/ pigmentation [Oriented x3] : oriented x3 [Normal Affect] : normal affect [TextBox_99] : In wheelchair.  Ambulates with cane. [TextBox_132] : Right-sided weakness with upper extremity contracture

## 2024-08-12 ENCOUNTER — APPOINTMENT (OUTPATIENT)
Dept: PULMONOLOGY | Facility: CLINIC | Age: 84
End: 2024-08-12

## 2024-08-12 NOTE — FAMILY HISTORY
[FreeTextEntry1] : Documented by Daniele Barksdale acting as a scribe for Dr. Leonel Duckworth on 08/12/2024. All medical record entries made by the Scribe were at my, Dr. Leonel Duckworth's, direction and personally dictated by me on 08/12/2024. I have reviewed the chart and agree that the record accurately reflects my personal performance of the history, physical exam, assessment and plan. I have also personally directed, reviewed, and agree with the discharge instructions. [Family History Reviewed and Updated] : family history reviewed and updated

## 2024-08-13 ENCOUNTER — APPOINTMENT (OUTPATIENT)
Dept: ENDOCRINOLOGY | Facility: CLINIC | Age: 84
End: 2024-08-13
Payer: MEDICARE

## 2024-08-13 VITALS
OXYGEN SATURATION: 95 % | DIASTOLIC BLOOD PRESSURE: 73 MMHG | TEMPERATURE: 98.3 F | SYSTOLIC BLOOD PRESSURE: 124 MMHG | HEART RATE: 66 BPM

## 2024-08-13 DIAGNOSIS — E26.09 OTHER PRIMARY HYPERALDOSTERONISM: ICD-10-CM

## 2024-08-13 DIAGNOSIS — E04.9 NONTOXIC GOITER, UNSPECIFIED: ICD-10-CM

## 2024-08-13 DIAGNOSIS — E78.5 HYPERLIPIDEMIA, UNSPECIFIED: ICD-10-CM

## 2024-08-13 DIAGNOSIS — I27.20 PULMONARY HYPERTENSION, UNSPECIFIED: ICD-10-CM

## 2024-08-13 DIAGNOSIS — E27.9 DISORDER OF ADRENAL GLAND, UNSPECIFIED: ICD-10-CM

## 2024-08-13 DIAGNOSIS — R73.03 PREDIABETES.: ICD-10-CM

## 2024-08-13 PROCEDURE — 99214 OFFICE O/P EST MOD 30 MIN: CPT

## 2024-08-13 PROCEDURE — G2211 COMPLEX E/M VISIT ADD ON: CPT

## 2024-08-13 NOTE — REASON FOR VISIT
[Follow - Up] : a follow-up visit [Adrenal Evaluation/Adrenal Disorder] : adrenal evaluation/adrenal disorder [Thyroid nodule/ MNG] : thyroid nodule/ MNG [Family Member] : family member

## 2024-08-13 NOTE — ASSESSMENT
[FreeTextEntry1] : 84F Thyroid mass partial resection, Afib - Eliquis, CVA w/ Rt hemiparesis, HTN, TR w/ PulmHTN, p/w worsening Rt hemiparesis c/b HTN emergency, enlarging thyroid mass - suspect goiter. Was seen in the hospital for hemiparesis in early Oct 2023.  Referred to endocrine for adrenal incidentaloma and substernal thyroid goiter.  Thyroid goiter Hyperthyroidism, likely thyroiditis, now euthyroid Having MORRISSEY. Labs and imaging as above. TFTs normal now. -On beta-blockade with good heart rate control -Following with head and neck surgery, next visit in 1 year -Patient wants to discuss thyroid nodule biopsy at a later date, not interested now.  Adrenal adenoma CT adrenal protocol and MRI as above. Dex suppression test abnormal but 24 hour urine cortisol normal. Has evidence of hyperaldo. Metanephrines negative. Late night saliva cortisol x2 negative. -Taken off spironolactone due to hyperkalemia at [facility]. May have restarted it, remains unclear. If not on it, may want to reinitiate without valsartan and with close monitoring. Patient declines aldosterone suppression testing at this time. Renin was no longer suppressed on aldactone. Will request nephro input. -Had normal DXA at [facility] Oct 2023. Images reviewed by me.  HLD On atorvastatin 40mg daily. Recent lipids reviewed.  RTC 4 months.  Best Heath DO.

## 2024-08-13 NOTE — HISTORY OF PRESENT ILLNESS
[FreeTextEntry1] : 83F Thyroid mass partial resection, Afib - Eliquis, CVA w/ Rt hemiparesis, HTN, TR w/ PulmHTN, p/w worsening Rt hemiparesis c/b HTN emergency, enlarging thyroid mass - suspect goiter. Was seen in the hospital for hemiparesis in early Oct 2023.  Referred to endocrine for adrenal incidentaloma and substernal thyroid goiter.  TSH in the hospital normal.  Twenty-thirty years ago had goiter with partial thyroidectomy. Path was benign per patient. No need for f/u per patient.  Had CT that showed: CHEST WALL AND LOWER NECK: Prominent right paratracheal/substernal extension of thyroid goiter, as seen on prior CT scan. ADRENALS: 2.2 cm left adrenal nodule corresponding with previously present adenoma, increased size, previously 1.4 cm.  CTA neck showed: Redemonstrated large heterogeneous thyroid mass with calcification with intrathoracic extension into the right upper lobe with compression of the upper lobe bronchus and associated atelectasis, unchanged. Left apical discoid atelectasis and/or scarring.  Thyroid ultrasound performed on November 27, 2023 showed bilateral thyroid nodules including dominant right thyroid midpole nodule, 2.6 cm, isoechoic, TI-RADS 3.  Patient never had this biopsied previously.  Patient saw otolaryngologist Dr. Marion in late November 2023.  Dr. Marion performed fiberoptic laryngoscopy.  Does not believe the patient's shortness of breath is related to the goiter, and recommends surveillance for now.  Plans to repeat assessment in 1 year.    TFTs showed overt hyperthyroidism. Graves' antibodies negative, TPO antibody positive.  Abnormal thyroid uptake and scan. Findings are suggestive of thyroiditis with 0.6% 24-hour neck uptake, markedly decreased. Attention to followup. TFTs now normal (Feb 2024), likely with thyroiditis. Can plan for thyroid nodule biopsy as NM uptake and scan showed no hot nodule. No need for methimazole, PAVON or surgery. Monitor TFTs (6 weeks) to ensure does not become hypothyroid. Stimulus for hyperthyroidism likely iodine-induced from imaging.  No compressive symptoms. No tremors or palpitations. Is getting SOB with mild exertion. Denies prior radiation therapy/exposure. Had partial thyroidectomy. No prior tx for hyperthyroidism or hypothyroidism, including medication or PAVON. Weight stable.  Creatinine bumped from 0.92 in late Dec 2023 to 1.43. Seeing nephro, cr stable now. Spironolactone was discontinued in July 2024 after hospitalization for elevated potassium (noted on outpatient labs with PCP). Following low potassium diet. BP usually well-controlled at home. Can f/u with nephro. Currently on amlodipine-valsartan combo, carvedilol, clonidine, hydralazine. Went back to PCP due to swelling so aldactone was re-instituted according to daughter-in-law. Remains unclear though as patient and aide think she hasn't been taking it.  Has prediabetes though in remission without meds.   CT abdomen and pelvis with and without IV contrast following adrenal protocol shows 2.2 cm left adrenal nodule, mildly increased in size compared with 2008 (1.6 cm). The nodule is now heterogeneous and contains a rounded mildly hyperdense and nonenhancing component.  Per radiologist, this likely represents an adenoma that bled.  MRI showed stable unilateral adrenal nodule with evidence of hemorrhage. No clear suggestion of metastatic disease.  Renin suppressed. Aldosterone 8.4. Would proceed with 24-hour urine aldosterone suppression test using oral sodium loading, but patient unlikely to pursue surgery for primary aldosteronism.  Wishes to continue treatment with current BP meds. Was taken off aldactone due to hyperkalemia as above. Is possibly back on it, unclear. Would suggest trying again if she's not on it and removing valsartan but will discuss with nephro first.  Plasma metanephrines were negative.  ACTH unable to be tested as specimen not suitable for testing. A.m. cortisol 12.0 (independent of Dex suppression test).  Performed 1 mg Dex suppression test.  Cortisol a.m. 2.3 with therapeutic dex level.  Cortisol inadequately suppressed with adequate Dex level. Proceeded with 24-hour urine cortisol collection for confirmation. 24 hour urine cortisol normal. Late night saliva cortisol x2 negative.   FHx: Uncle with goiter and thyroid surgery. No thyroid cancer in family. No other endocrine problems in the family.

## 2024-09-20 ENCOUNTER — APPOINTMENT (OUTPATIENT)
Dept: NEPHROLOGY | Facility: CLINIC | Age: 84
End: 2024-09-20
Payer: MEDICARE

## 2024-09-20 VITALS — DIASTOLIC BLOOD PRESSURE: 70 MMHG | SYSTOLIC BLOOD PRESSURE: 140 MMHG

## 2024-09-20 VITALS
BODY MASS INDEX: 27.49 KG/M2 | SYSTOLIC BLOOD PRESSURE: 168 MMHG | WEIGHT: 161 LBS | OXYGEN SATURATION: 95 % | HEIGHT: 64 IN | HEART RATE: 73 BPM | DIASTOLIC BLOOD PRESSURE: 74 MMHG

## 2024-09-20 DIAGNOSIS — I10 ESSENTIAL (PRIMARY) HYPERTENSION: ICD-10-CM

## 2024-09-20 DIAGNOSIS — N17.9 ACUTE KIDNEY FAILURE, UNSPECIFIED: ICD-10-CM

## 2024-09-20 PROCEDURE — 99214 OFFICE O/P EST MOD 30 MIN: CPT

## 2024-09-20 RX ORDER — FUROSEMIDE 20 MG/1
20 TABLET ORAL DAILY
Refills: 0 | Status: ACTIVE | COMMUNITY
Start: 2024-09-20

## 2024-09-20 RX ORDER — AMLODIPINE AND VALSARTAN 5; 160 MG/1; MG/1
5-160 TABLET, FILM COATED ORAL DAILY
Refills: 0 | Status: ACTIVE | COMMUNITY
Start: 2024-09-20

## 2024-09-20 NOTE — ASSESSMENT
[FreeTextEntry1] : 1. JACLYN: Pt. with JACLYN, likely hemodynamically mediated in setting of diuretic and ARB therapy use. On review of previous labs on [employer], Scr was WNL at 0.62 on 4/20/23. Scr remained WNL at 0.62 on 12/12/23, increased to 1.43 on labs done on 2/27/24. Scr was elevated/stable at 1.32 on 3/5/24. UPCR was WNL at 0.2 on 3/5/24. As per endocrinologist Dr. Heath's note on 8/13/24, oral Spironolactone was discontinued for hyperkalemia during admission to [facility]. Last Scr was WNL at 0.7 and serum potassium was WNL at 4.3 on 7/31/24. Check renal panel today. Avoid NSAIDs, RCAs, and other nephrotoxins. Monitor renal panel.    2. HTN: Pt. saw endocrinologist Dr. Best Heath for initial evaluation of adrenal nodule/incidentaloma on 11/7/23. Pt. underwent MR abdomen on 12/21/23 which demonstrated a 1.7 cm left adrenal nodule. No evidence of hydronephrosis, renal masses or perinephric collections but there was some evidence of bilateral renal cortical scarring on MR study. Current BP meds reviewed with patient during clinic visit. Pt. currently taking Carvedilol, Amlodipine-Valsartan, Hydralazine, Lasix and Clonidine for BP management. As per Dr. Heath's note on 8/13/24, oral Spironolactone was discontinued for hyperkalemia during admission to [facility]. Home BP log reviewed. Recent home SBPs ranging in the 120s-150s. Repeat BP improved to acceptable range during clinic visit today. Low salt diet advised. Monitor BP on current BP meds.   Follow-up pending review of lab results.

## 2024-09-20 NOTE — HISTORY OF PRESENT ILLNESS
[FreeTextEntry1] : 84-year-old female with longstanding history of HTN (>20 years), (presumed) hyperaldosteronism from adrenal adenoma, hypothyroidism, CVA with RUE weakness and AFib presented to clinic for follow-up visit. Pt. was seen for initial evaluation of elevated Scr/JACLYN on 3/20/24. Pt. accompanied with formal caregiver.   Pt. currently feels well. No fever, CP, SOB, HA, dizziness or LE edema during clinic visit today. No urinary complaints. Current BP meds reviewed with patient during clinic visit. As per endocrinologist Dr. Heath's note on 8/13/24, oral Spironolactone was discontinued for hyperkalemia during admission to [facility]. Scr was WNL at 0.7 and serum potassium was WNL at 4.3 on 7/31/24.

## 2024-09-20 NOTE — REVIEW OF SYSTEMS
[Fever] : no fever [Chills] : no chills [Eye Pain] : no eye pain [Earache] : no earache [Sore Throat] : no sore throat [Chest Pain] : no chest pain [Palpitations] : no palpitations [Lower Ext Edema] : no lower extremity edema [Shortness Of Breath] : no shortness of breath [Cough] : no cough [Abdominal Pain] : no abdominal pain [Vomiting] : no vomiting [Diarrhea] : no diarrhea [Dysuria] : no dysuria [Limb Swelling] : no limb swelling [Skin Lesions] : no skin lesions [Confused] : no confusion [Dizziness] : no dizziness [Anxiety] : no anxiety [Depression] : no depression [Easy Bleeding] : no tendency for easy bleeding [Easy Bruising] : no tendency for easy bruising [de-identified] : GUNJAN clay

## 2024-09-23 ENCOUNTER — APPOINTMENT (OUTPATIENT)
Dept: HOME HEALTH SERVICES | Facility: HOME HEALTH | Age: 84
End: 2024-09-23

## 2024-09-23 NOTE — PHYSICAL EXAM
[No Acute Distress] : no acute distress [Normal Voice/Communication] : normal voice communication [Normal Sclera/Conjunctiva] : normal sclera/conjunctiva [PERRL] : pupils equal, round and reactive to light [EOMI] : extra ocular movement intact [Normal Outer Ear/Nose] : the ears and nose were normal in appearance [Normal Oropharynx] : the oropharynx was normal [No JVD] : no jugular venous distention [Supple] : the neck was supple [No Respiratory Distress] : no respiratory distress [Clear to Auscultation] : lungs were clear to auscultation bilaterally [No Accessory Muscle Use] : no accessory muscle use [Normal Rate] : heart rate was normal  [Regular Rhythm] : with a regular rhythm [Normal S1, S2] : normal S1 and S2 [No Murmurs] : no murmurs heard [No Edema] : there was no peripheral edema [Normal Bowel Sounds] : normal bowel sounds [Non Tender] : non-tender [Soft] : abdomen soft [Not Distended] : not distended [No CVA Tenderness] : no ~M costovertebral angle tenderness [No Spinal Tenderness] : no spinal tenderness [No Joint Swelling] : no joint swelling seen [No Rash] : no rash [No Skin Lesions] : no skin lesions [Oriented x3] : oriented to person, place, and time [Normal Affect] : the affect was normal [de-identified] : dysarthria  [de-identified] : Right sided weakness and right foot drop [de-identified] : Loss of nasolabial fold on right side of face, 3+ strength in RUE and RLE

## 2024-09-23 NOTE — COUNSELING
[Overweight - ( BMI 25.1 - 29.9 )] : overweight -  ( BMI 25.1 - 29.9 ) [Sodium restriction 2gm recommended] : sodium restriction 2 gm recommended [Non - Smoker] : non-smoker [Use grab bars] : use grab bars [Use assistive device to avoid falls] : use assistive device to avoid falls [Remove clutter and unsafe carpeting to avoid falls] : remove clutter and unsafe carpeting to avoid falls [] : foot exam [Minimize unnecessary interventions] : minimize unnecessary interventions [Maintain functional ability] : maintain functional ability [Discussed disease trajectory with patient/caregiver] : discussed disease trajectory with patient/caregiver [Annual Discussion and review of: ___] : Annual discussion and review of [unfilled] [DNR] : Code Status: DNR [Limited] : Treatment Guidelines: Limited [DNI] : Intubation: DNI [Last Verification Date: _____] : RUSTST Completion/last verification date: [unfilled] [_____] : HCP: [unfilled]

## 2024-09-23 NOTE — CHRONIC CARE ASSESSMENT
[PPS Score: ____] : Palliative Performance Scale (PPS) Score: [unfilled] [de-identified] : low sodium diet

## 2024-09-23 NOTE — HISTORY OF PRESENT ILLNESS
[In-Place] : has aide services in-place [A] : A [Patient is stable - had PCP appoinment] : patient is stable - had PCP appointment [LastPVisitDate] : 5/2023 [FreeTextEntry4] : Dr Dent [LastSpecialistVisitDate] : 2/2023 [Patient] : patient [Formal Caregiver] : formal caregiver [FreeTextEntry2] : TIFFANY LIM is a 82 year female with PMH of prediabetes, HTN, AFib and CVA with right residual weakness being seen for follow up.   Interval Events -   Subjective: -Appetite/weight: good appetite.  solids consistency. No dysphagia. Weight stable.  -Gait/falls: drags right foot when walking. Uses cane walking. No falls.  -Pain: bilateral knee pain mostly on right. Used to use lidocaine cream but now using Tylenol 1000mg Q6H prn pain.  -Sleep: sleeps well.  -BMs: daily BM. no hematochezia. Uses Sennakot for constipation.  -Urine: no concerns. continent.  uses diapers when going out.  -Skin: intact  -DME: cane, motorized wheelchair not working, shower chair, grab bars, commode -Mood/memory: mood is ok. Short term memory issues at times.   -Communication: conversational.  -Health Maintenance: above age of screening guidelines. UTD on vaccines.  -Hospitalizations in the past year: none    Medical Issues: a) Prediabetes: A1c 6.1% (4/2023). continue diet control.  b) HTN: Chronic. Uncontrolled. Will increase clonidine to 0.3 mg BID and continue Enalapril. BP check in two weeks.  c) Afib: Chronic. Continue Coreg and Eliquis. FU with cardiology.  d) CVA with right residual weakness and aphasia: needs full assistance with ADLs. Unsure why not on statin. patient denies allergy to statin. Discussed with Dr Toney if any contraindication, lipids being managed by Dr Zapata. Lipid panel normal in a non diabetic. Will hold off on initiating.  e) Tricuspid Regurgitation: Echo done 8/2022 showed normal LV systolic function, moderate- severe TR. Cardiology recommended TV clip but patient doesn't want any further evaluation. FU with cardiology.  f) Pulmonary HTN: moderate to severe (echo 8/2022). No dyspnea reported. FU with cardiology.   .  Specialists: PCP: Dr Mary Alice Zapata Cardiologist: Dr Iwona Dent   Social hx: lives alone. 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 .  Caregivers: 24/7 HHA MOLST: DNR, DNI, Hospitalize, No feeding tube, Antibiotics and IV ok. Reviewed 3/8/2023.   HCP: Salomon Beebe and Keon Beebe   NEED FOR MOTORIZED WHEELCHAIR:  -Symptoms limiting ambulation: Right hemiplegia with right foot drop -Diagnoses responsible for symptoms: CVA -Medications & other treatments for these symptoms: none, only prevention of recurrence of stroke.  -Progression of ambulation difficulty over time? no progression, limitation is static.  -Other diagnoses related to ambulatory problems: knee pain due to stress on joints caused by hemiplegia  -Ambulatory distance with cane/walker: 6-10 feet  -Pace of ambulation: slow  -History of falls: no falls recently -Current ambulation assistance & why it isn't sufficient: Uses cane but can only transverse short distances -What has changed requiring use of motor chair: patient would like not to be limited by physical distance when out of home -Ability to use a manual wheelchair:  limited due to right sided weakness thus can't mobilize wheel with her hand/arm

## 2024-09-26 ENCOUNTER — NON-APPOINTMENT (OUTPATIENT)
Age: 84
End: 2024-09-26

## 2024-09-26 LAB
ALBUMIN SERPL ELPH-MCNC: 4.4 G/DL
ANION GAP SERPL CALC-SCNC: 14 MMOL/L
BUN SERPL-MCNC: 13 MG/DL
CALCIUM SERPL-MCNC: 9.9 MG/DL
CHLORIDE SERPL-SCNC: 103 MMOL/L
CO2 SERPL-SCNC: 23 MMOL/L
CREAT SERPL-MCNC: 0.78 MG/DL
EGFR: 75 ML/MIN/1.73M2
GLUCOSE SERPL-MCNC: 71 MG/DL
PHOSPHATE SERPL-MCNC: 3.2 MG/DL
POTASSIUM SERPL-SCNC: 4.2 MMOL/L
SODIUM SERPL-SCNC: 140 MMOL/L

## 2024-12-03 ENCOUNTER — APPOINTMENT (OUTPATIENT)
Dept: ENDOCRINOLOGY | Facility: CLINIC | Age: 84
End: 2024-12-03

## 2024-12-03 ENCOUNTER — APPOINTMENT (OUTPATIENT)
Dept: CT IMAGING | Facility: CLINIC | Age: 84
End: 2024-12-03
Payer: MEDICARE

## 2024-12-03 PROCEDURE — 70491 CT SOFT TISSUE NECK W/DYE: CPT

## 2024-12-11 ENCOUNTER — APPOINTMENT (OUTPATIENT)
Dept: PULMONOLOGY | Facility: CLINIC | Age: 84
End: 2024-12-11
Payer: MEDICARE

## 2024-12-11 VITALS
HEART RATE: 63 BPM | TEMPERATURE: 98.5 F | SYSTOLIC BLOOD PRESSURE: 147 MMHG | OXYGEN SATURATION: 93 % | BODY MASS INDEX: 27.31 KG/M2 | HEIGHT: 64 IN | RESPIRATION RATE: 16 BRPM | WEIGHT: 160 LBS | DIASTOLIC BLOOD PRESSURE: 67 MMHG

## 2024-12-11 DIAGNOSIS — J90 PLEURAL EFFUSION, NOT ELSEWHERE CLASSIFIED: ICD-10-CM

## 2024-12-11 PROCEDURE — 99214 OFFICE O/P EST MOD 30 MIN: CPT

## 2025-01-21 ENCOUNTER — APPOINTMENT (OUTPATIENT)
Dept: ENDOCRINOLOGY | Facility: CLINIC | Age: 85
End: 2025-01-21
Payer: MEDICARE

## 2025-01-21 VITALS
SYSTOLIC BLOOD PRESSURE: 158 MMHG | OXYGEN SATURATION: 96 % | DIASTOLIC BLOOD PRESSURE: 79 MMHG | HEART RATE: 63 BPM | HEIGHT: 64 IN | TEMPERATURE: 98.2 F

## 2025-01-21 DIAGNOSIS — I10 ESSENTIAL (PRIMARY) HYPERTENSION: ICD-10-CM

## 2025-01-21 DIAGNOSIS — R79.89 OTHER SPECIFIED ABNORMAL FINDINGS OF BLOOD CHEMISTRY: ICD-10-CM

## 2025-01-21 DIAGNOSIS — E27.9 DISORDER OF ADRENAL GLAND, UNSPECIFIED: ICD-10-CM

## 2025-01-21 DIAGNOSIS — E78.5 HYPERLIPIDEMIA, UNSPECIFIED: ICD-10-CM

## 2025-01-21 DIAGNOSIS — R73.03 PREDIABETES.: ICD-10-CM

## 2025-01-21 PROCEDURE — G2211 COMPLEX E/M VISIT ADD ON: CPT

## 2025-01-21 PROCEDURE — 99214 OFFICE O/P EST MOD 30 MIN: CPT

## 2025-01-22 LAB
ALBUMIN SERPL ELPH-MCNC: 4.6 G/DL
ALP BLD-CCNC: 145 U/L
ALT SERPL-CCNC: 24 U/L
ANION GAP SERPL CALC-SCNC: 16 MMOL/L
AST SERPL-CCNC: 34 U/L
BILIRUB SERPL-MCNC: 0.6 MG/DL
BUN SERPL-MCNC: 17 MG/DL
CALCIUM SERPL-MCNC: 10.2 MG/DL
CHLORIDE SERPL-SCNC: 104 MMOL/L
CHOLEST SERPL-MCNC: 132 MG/DL
CO2 SERPL-SCNC: 24 MMOL/L
CREAT SERPL-MCNC: 0.67 MG/DL
EGFR: 86 ML/MIN/1.73M2
ESTIMATED AVERAGE GLUCOSE: 108 MG/DL
GLUCOSE SERPL-MCNC: 88 MG/DL
HBA1C MFR BLD HPLC: 5.4 %
HDLC SERPL-MCNC: 68 MG/DL
LDLC SERPL CALC-MCNC: 52 MG/DL
NONHDLC SERPL-MCNC: 63 MG/DL
POTASSIUM SERPL-SCNC: 4.3 MMOL/L
PROT SERPL-MCNC: 8.1 G/DL
SODIUM SERPL-SCNC: 143 MMOL/L
T3 SERPL-MCNC: 156 NG/DL
T4 FREE SERPL-MCNC: 1.7 NG/DL
TRIGL SERPL-MCNC: 49 MG/DL
TSH SERPL-ACNC: 0.02 UIU/ML

## 2025-01-29 LAB — RENIN ACTIVITY, PLASMA: 0.37 NG/ML/HR

## 2025-02-24 ENCOUNTER — NON-APPOINTMENT (OUTPATIENT)
Age: 85
End: 2025-02-24

## 2025-03-03 ENCOUNTER — NON-APPOINTMENT (OUTPATIENT)
Age: 85
End: 2025-03-03

## 2025-03-04 ENCOUNTER — APPOINTMENT (OUTPATIENT)
Dept: ENDOCRINOLOGY | Facility: CLINIC | Age: 85
End: 2025-03-04
Payer: MEDICARE

## 2025-03-04 VITALS
BODY MASS INDEX: 27.66 KG/M2 | HEART RATE: 64 BPM | WEIGHT: 162 LBS | TEMPERATURE: 98.4 F | OXYGEN SATURATION: 97 % | SYSTOLIC BLOOD PRESSURE: 158 MMHG | HEIGHT: 64 IN | DIASTOLIC BLOOD PRESSURE: 78 MMHG | RESPIRATION RATE: 18 BRPM

## 2025-03-04 DIAGNOSIS — E78.5 HYPERLIPIDEMIA, UNSPECIFIED: ICD-10-CM

## 2025-03-04 DIAGNOSIS — R79.89 OTHER SPECIFIED ABNORMAL FINDINGS OF BLOOD CHEMISTRY: ICD-10-CM

## 2025-03-04 DIAGNOSIS — E26.09 OTHER PRIMARY HYPERALDOSTERONISM: ICD-10-CM

## 2025-03-04 DIAGNOSIS — E04.9 NONTOXIC GOITER, UNSPECIFIED: ICD-10-CM

## 2025-03-04 DIAGNOSIS — I69.30 UNSPECIFIED SEQUELAE OF CEREBRAL INFARCTION: ICD-10-CM

## 2025-03-04 DIAGNOSIS — E27.9 DISORDER OF ADRENAL GLAND, UNSPECIFIED: ICD-10-CM

## 2025-03-04 DIAGNOSIS — I10 ESSENTIAL (PRIMARY) HYPERTENSION: ICD-10-CM

## 2025-03-04 DIAGNOSIS — R73.03 PREDIABETES.: ICD-10-CM

## 2025-03-04 DIAGNOSIS — N18.32 CHRONIC KIDNEY DISEASE, STAGE 3B: ICD-10-CM

## 2025-03-04 PROCEDURE — G2211 COMPLEX E/M VISIT ADD ON: CPT

## 2025-03-04 PROCEDURE — 99215 OFFICE O/P EST HI 40 MIN: CPT

## 2025-03-04 RX ORDER — AMLODIPINE BESYLATE VALSARTAN HYDROCHLOROTHIAZIDE 10; 25; 320 MG/1; MG/1; MG/1
TABLET, FILM COATED ORAL
Refills: 0 | Status: ACTIVE | COMMUNITY

## 2025-03-04 RX ORDER — SPIRONOLACTONE 25 MG/1
25 TABLET ORAL DAILY
Qty: 90 | Refills: 1 | Status: ACTIVE | COMMUNITY
Start: 2025-03-04 | End: 1900-01-01

## 2025-03-04 RX ORDER — AMLODIPINE AND VALSARTAN 5; 160 MG/1; MG/1
5-160 TABLET, FILM COATED ORAL DAILY
Qty: 1 | Refills: 3 | Status: ACTIVE | COMMUNITY
Start: 2025-03-04 | End: 1900-01-01

## 2025-03-06 LAB
BASOPHILS # BLD AUTO: 0.04 K/UL
BASOPHILS NFR BLD AUTO: 0.5 %
EOSINOPHIL # BLD AUTO: 0.16 K/UL
EOSINOPHIL NFR BLD AUTO: 2 %
HCT VFR BLD CALC: 32 %
HGB BLD-MCNC: 10 G/DL
IMM GRANULOCYTES NFR BLD AUTO: 0.3 %
LYMPHOCYTES # BLD AUTO: 2.53 K/UL
LYMPHOCYTES NFR BLD AUTO: 32.1 %
MAN DIFF?: NORMAL
MCHC RBC-ENTMCNC: 26.6 PG
MCHC RBC-ENTMCNC: 31.3 G/DL
MCV RBC AUTO: 85.1 FL
MONOCYTES # BLD AUTO: 0.97 K/UL
MONOCYTES NFR BLD AUTO: 12.3 %
NEUTROPHILS # BLD AUTO: 4.16 K/UL
NEUTROPHILS NFR BLD AUTO: 52.8 %
PLATELET # BLD AUTO: 306 K/UL
RBC # BLD: 3.76 M/UL
RBC # FLD: 14.6 %
T3 SERPL-MCNC: 133 NG/DL
T4 FREE SERPL-MCNC: 1.3 NG/DL
TSH SERPL-ACNC: 0.77 UIU/ML
WBC # FLD AUTO: 7.88 K/UL

## 2025-03-12 ENCOUNTER — APPOINTMENT (OUTPATIENT)
Dept: PULMONOLOGY | Facility: CLINIC | Age: 85
End: 2025-03-12
Payer: MEDICARE

## 2025-03-12 VITALS
HEART RATE: 70 BPM | WEIGHT: 152 LBS | BODY MASS INDEX: 25.95 KG/M2 | HEIGHT: 64 IN | RESPIRATION RATE: 16 BRPM | OXYGEN SATURATION: 96 % | TEMPERATURE: 98.2 F | SYSTOLIC BLOOD PRESSURE: 152 MMHG | DIASTOLIC BLOOD PRESSURE: 62 MMHG

## 2025-03-12 PROCEDURE — 99214 OFFICE O/P EST MOD 30 MIN: CPT

## 2025-03-12 RX ORDER — SPIRONOLACTONE 25 MG/1
25 TABLET ORAL
Refills: 0 | Status: ACTIVE | COMMUNITY

## 2025-03-18 ENCOUNTER — NON-APPOINTMENT (OUTPATIENT)
Age: 85
End: 2025-03-18

## 2025-04-01 ENCOUNTER — APPOINTMENT (OUTPATIENT)
Dept: ENDOCRINOLOGY | Facility: CLINIC | Age: 85
End: 2025-04-01
Payer: MEDICARE

## 2025-04-01 VITALS
HEART RATE: 62 BPM | WEIGHT: 152 LBS | OXYGEN SATURATION: 96 % | RESPIRATION RATE: 16 BRPM | TEMPERATURE: 97.6 F | DIASTOLIC BLOOD PRESSURE: 51 MMHG | SYSTOLIC BLOOD PRESSURE: 110 MMHG | HEIGHT: 64 IN | BODY MASS INDEX: 25.95 KG/M2

## 2025-04-01 DIAGNOSIS — E78.5 HYPERLIPIDEMIA, UNSPECIFIED: ICD-10-CM

## 2025-04-01 DIAGNOSIS — R79.89 OTHER SPECIFIED ABNORMAL FINDINGS OF BLOOD CHEMISTRY: ICD-10-CM

## 2025-04-01 DIAGNOSIS — E27.9 DISORDER OF ADRENAL GLAND, UNSPECIFIED: ICD-10-CM

## 2025-04-01 DIAGNOSIS — E04.9 NONTOXIC GOITER, UNSPECIFIED: ICD-10-CM

## 2025-04-01 DIAGNOSIS — I69.30 UNSPECIFIED SEQUELAE OF CEREBRAL INFARCTION: ICD-10-CM

## 2025-04-01 DIAGNOSIS — I10 ESSENTIAL (PRIMARY) HYPERTENSION: ICD-10-CM

## 2025-04-01 DIAGNOSIS — E26.09 OTHER PRIMARY HYPERALDOSTERONISM: ICD-10-CM

## 2025-04-01 PROCEDURE — 99215 OFFICE O/P EST HI 40 MIN: CPT

## 2025-04-01 PROCEDURE — G2211 COMPLEX E/M VISIT ADD ON: CPT

## 2025-05-01 ENCOUNTER — APPOINTMENT (OUTPATIENT)
Dept: RADIOLOGY | Facility: CLINIC | Age: 85
End: 2025-05-01
Payer: MEDICARE

## 2025-05-01 PROCEDURE — 71046 X-RAY EXAM CHEST 2 VIEWS: CPT

## 2025-05-13 ENCOUNTER — APPOINTMENT (OUTPATIENT)
Dept: ENDOCRINOLOGY | Facility: CLINIC | Age: 85
End: 2025-05-13
Payer: MEDICARE

## 2025-05-13 VITALS
DIASTOLIC BLOOD PRESSURE: 63 MMHG | HEIGHT: 64 IN | OXYGEN SATURATION: 96 % | SYSTOLIC BLOOD PRESSURE: 152 MMHG | HEART RATE: 66 BPM | BODY MASS INDEX: 25.95 KG/M2 | TEMPERATURE: 98.1 F | WEIGHT: 152 LBS | RESPIRATION RATE: 18 BRPM

## 2025-05-13 DIAGNOSIS — E11.22 TYPE 2 DIABETES MELLITUS WITH DIABETIC CHRONIC KIDNEY DISEASE: ICD-10-CM

## 2025-05-13 DIAGNOSIS — E78.5 HYPERLIPIDEMIA, UNSPECIFIED: ICD-10-CM

## 2025-05-13 DIAGNOSIS — N17.9 ACUTE KIDNEY FAILURE, UNSPECIFIED: ICD-10-CM

## 2025-05-13 DIAGNOSIS — E26.09 OTHER PRIMARY HYPERALDOSTERONISM: ICD-10-CM

## 2025-05-13 DIAGNOSIS — R73.03 PREDIABETES.: ICD-10-CM

## 2025-05-13 DIAGNOSIS — E27.9 DISORDER OF ADRENAL GLAND, UNSPECIFIED: ICD-10-CM

## 2025-05-13 DIAGNOSIS — R79.89 OTHER SPECIFIED ABNORMAL FINDINGS OF BLOOD CHEMISTRY: ICD-10-CM

## 2025-05-13 DIAGNOSIS — E04.9 NONTOXIC GOITER, UNSPECIFIED: ICD-10-CM

## 2025-05-13 DIAGNOSIS — I10 ESSENTIAL (PRIMARY) HYPERTENSION: ICD-10-CM

## 2025-05-13 DIAGNOSIS — N18.30 TYPE 2 DIABETES MELLITUS WITH DIABETIC CHRONIC KIDNEY DISEASE: ICD-10-CM

## 2025-05-13 PROCEDURE — G2211 COMPLEX E/M VISIT ADD ON: CPT

## 2025-05-13 PROCEDURE — 99215 OFFICE O/P EST HI 40 MIN: CPT

## 2025-07-09 ENCOUNTER — APPOINTMENT (OUTPATIENT)
Dept: PULMONOLOGY | Facility: CLINIC | Age: 85
End: 2025-07-09
Payer: MEDICARE

## 2025-07-09 VITALS
HEIGHT: 64 IN | SYSTOLIC BLOOD PRESSURE: 145 MMHG | BODY MASS INDEX: 28 KG/M2 | HEART RATE: 60 BPM | WEIGHT: 164 LBS | RESPIRATION RATE: 16 BRPM | OXYGEN SATURATION: 97 % | TEMPERATURE: 97.6 F | DIASTOLIC BLOOD PRESSURE: 74 MMHG

## 2025-07-09 PROCEDURE — 99214 OFFICE O/P EST MOD 30 MIN: CPT

## 2025-08-19 ENCOUNTER — APPOINTMENT (OUTPATIENT)
Dept: ENDOCRINOLOGY | Facility: CLINIC | Age: 85
End: 2025-08-19
Payer: MEDICARE

## 2025-08-19 VITALS
BODY MASS INDEX: 28 KG/M2 | SYSTOLIC BLOOD PRESSURE: 129 MMHG | HEART RATE: 64 BPM | TEMPERATURE: 98.1 F | WEIGHT: 164 LBS | RESPIRATION RATE: 18 BRPM | OXYGEN SATURATION: 96 % | DIASTOLIC BLOOD PRESSURE: 67 MMHG | HEIGHT: 64 IN

## 2025-08-19 DIAGNOSIS — I10 ESSENTIAL (PRIMARY) HYPERTENSION: ICD-10-CM

## 2025-08-19 DIAGNOSIS — N18.32 CHRONIC KIDNEY DISEASE, STAGE 3B: ICD-10-CM

## 2025-08-19 DIAGNOSIS — E04.9 NONTOXIC GOITER, UNSPECIFIED: ICD-10-CM

## 2025-08-19 DIAGNOSIS — R73.03 PREDIABETES.: ICD-10-CM

## 2025-08-19 DIAGNOSIS — E78.5 HYPERLIPIDEMIA, UNSPECIFIED: ICD-10-CM

## 2025-08-19 DIAGNOSIS — I27.20 PULMONARY HYPERTENSION, UNSPECIFIED: ICD-10-CM

## 2025-08-19 DIAGNOSIS — E11.22 TYPE 2 DIABETES MELLITUS WITH DIABETIC CHRONIC KIDNEY DISEASE: ICD-10-CM

## 2025-08-19 DIAGNOSIS — I69.30 UNSPECIFIED SEQUELAE OF CEREBRAL INFARCTION: ICD-10-CM

## 2025-08-19 DIAGNOSIS — N18.30 TYPE 2 DIABETES MELLITUS WITH DIABETIC CHRONIC KIDNEY DISEASE: ICD-10-CM

## 2025-08-19 DIAGNOSIS — E27.9 DISORDER OF ADRENAL GLAND, UNSPECIFIED: ICD-10-CM

## 2025-08-19 DIAGNOSIS — N17.9 ACUTE KIDNEY FAILURE, UNSPECIFIED: ICD-10-CM

## 2025-08-19 DIAGNOSIS — E26.09 OTHER PRIMARY HYPERALDOSTERONISM: ICD-10-CM

## 2025-08-19 PROCEDURE — G2211 COMPLEX E/M VISIT ADD ON: CPT

## 2025-08-19 PROCEDURE — 99214 OFFICE O/P EST MOD 30 MIN: CPT

## 2025-09-19 LAB
ANION GAP SERPL CALC-SCNC: 14 MMOL/L
BUN SERPL-MCNC: 20 MG/DL
CALCIUM SERPL-MCNC: 10.2 MG/DL
CHLORIDE SERPL-SCNC: 101 MMOL/L
CO2 SERPL-SCNC: 23 MMOL/L
CREAT SERPL-MCNC: 1.11 MG/DL
EGFRCR SERPLBLD CKD-EPI 2021: 49 ML/MIN/1.73M2
GLUCOSE SERPL-MCNC: 58 MG/DL
POTASSIUM SERPL-SCNC: 5.4 MMOL/L
SODIUM SERPL-SCNC: 138 MMOL/L